# Patient Record
Sex: MALE | Race: WHITE | NOT HISPANIC OR LATINO
[De-identification: names, ages, dates, MRNs, and addresses within clinical notes are randomized per-mention and may not be internally consistent; named-entity substitution may affect disease eponyms.]

---

## 2017-03-05 ENCOUNTER — TRANSCRIPTION ENCOUNTER (OUTPATIENT)
Age: 52
End: 2017-03-05

## 2017-05-18 ENCOUNTER — APPOINTMENT (OUTPATIENT)
Dept: INTERNAL MEDICINE | Facility: CLINIC | Age: 52
End: 2017-05-18

## 2017-05-18 VITALS
HEIGHT: 70 IN | DIASTOLIC BLOOD PRESSURE: 90 MMHG | BODY MASS INDEX: 32.35 KG/M2 | SYSTOLIC BLOOD PRESSURE: 140 MMHG | WEIGHT: 226 LBS | HEART RATE: 82 BPM

## 2017-05-18 VITALS — DIASTOLIC BLOOD PRESSURE: 88 MMHG | SYSTOLIC BLOOD PRESSURE: 140 MMHG

## 2017-07-20 ENCOUNTER — RESULT REVIEW (OUTPATIENT)
Age: 52
End: 2017-07-20

## 2017-12-14 ENCOUNTER — APPOINTMENT (OUTPATIENT)
Dept: INTERNAL MEDICINE | Facility: CLINIC | Age: 52
End: 2017-12-14
Payer: COMMERCIAL

## 2017-12-14 VITALS — DIASTOLIC BLOOD PRESSURE: 86 MMHG | SYSTOLIC BLOOD PRESSURE: 150 MMHG

## 2017-12-14 VITALS
HEART RATE: 74 BPM | BODY MASS INDEX: 33.07 KG/M2 | WEIGHT: 231 LBS | HEIGHT: 70 IN | SYSTOLIC BLOOD PRESSURE: 156 MMHG | DIASTOLIC BLOOD PRESSURE: 90 MMHG

## 2017-12-14 PROCEDURE — 90688 IIV4 VACCINE SPLT 0.5 ML IM: CPT

## 2017-12-14 PROCEDURE — 99396 PREV VISIT EST AGE 40-64: CPT | Mod: 25

## 2017-12-14 PROCEDURE — G0008: CPT

## 2017-12-18 ENCOUNTER — MEDICATION RENEWAL (OUTPATIENT)
Age: 52
End: 2017-12-18

## 2018-06-11 ENCOUNTER — APPOINTMENT (OUTPATIENT)
Dept: INTERNAL MEDICINE | Facility: CLINIC | Age: 53
End: 2018-06-11
Payer: COMMERCIAL

## 2018-06-11 VITALS
DIASTOLIC BLOOD PRESSURE: 70 MMHG | HEIGHT: 70 IN | WEIGHT: 238 LBS | SYSTOLIC BLOOD PRESSURE: 140 MMHG | HEART RATE: 86 BPM | BODY MASS INDEX: 34.07 KG/M2

## 2018-06-11 PROCEDURE — 99213 OFFICE O/P EST LOW 20 MIN: CPT

## 2018-06-29 ENCOUNTER — MEDICATION RENEWAL (OUTPATIENT)
Age: 53
End: 2018-06-29

## 2018-09-17 ENCOUNTER — RX RENEWAL (OUTPATIENT)
Age: 53
End: 2018-09-17

## 2018-10-14 ENCOUNTER — RX RENEWAL (OUTPATIENT)
Age: 53
End: 2018-10-14

## 2018-12-17 ENCOUNTER — APPOINTMENT (OUTPATIENT)
Dept: INTERNAL MEDICINE | Facility: CLINIC | Age: 53
End: 2018-12-17
Payer: COMMERCIAL

## 2018-12-17 ENCOUNTER — NON-APPOINTMENT (OUTPATIENT)
Age: 53
End: 2018-12-17

## 2018-12-17 VITALS
SYSTOLIC BLOOD PRESSURE: 130 MMHG | HEART RATE: 83 BPM | OXYGEN SATURATION: 98 % | HEIGHT: 70 IN | DIASTOLIC BLOOD PRESSURE: 70 MMHG | WEIGHT: 234 LBS | BODY MASS INDEX: 33.5 KG/M2

## 2018-12-17 DIAGNOSIS — Z87.898 PERSONAL HISTORY OF OTHER SPECIFIED CONDITIONS: ICD-10-CM

## 2018-12-17 DIAGNOSIS — Z80.1 FAMILY HISTORY OF MALIGNANT NEOPLASM OF TRACHEA, BRONCHUS AND LUNG: ICD-10-CM

## 2018-12-17 DIAGNOSIS — Z87.09 PERSONAL HISTORY OF OTHER DISEASES OF THE RESPIRATORY SYSTEM: ICD-10-CM

## 2018-12-17 PROCEDURE — G0008: CPT

## 2018-12-17 PROCEDURE — 90688 IIV4 VACCINE SPLT 0.5 ML IM: CPT

## 2018-12-17 PROCEDURE — 99396 PREV VISIT EST AGE 40-64: CPT | Mod: 25

## 2018-12-17 PROCEDURE — 93000 ELECTROCARDIOGRAM COMPLETE: CPT

## 2018-12-17 NOTE — PHYSICAL EXAM
[No Acute Distress] : no acute distress [Normal Sclera/Conjunctiva] : normal sclera/conjunctiva [PERRL] : pupils equal round and reactive to light [EOMI] : extraocular movements intact [Normal Oropharynx] : the oropharynx was normal [Normal TMs] : both tympanic membranes were normal [Supple] : supple [No Respiratory Distress] : no respiratory distress  [Clear to Auscultation] : lungs were clear to auscultation bilaterally [Normal Rate] : normal rate  [Regular Rhythm] : with a regular rhythm [Normal S1, S2] : normal S1 and S2 [Pedal Pulses Present] : the pedal pulses are present [No Edema] : there was no peripheral edema [No Extremity Clubbing/Cyanosis] : no extremity clubbing/cyanosis [Soft] : abdomen soft [Non Tender] : non-tender [Normal Bowel Sounds] : normal bowel sounds [Normal Anterior Cervical Nodes] : no anterior cervical lymphadenopathy [No CVA Tenderness] : no CVA  tenderness [No Spinal Tenderness] : no spinal tenderness [No Joint Swelling] : no joint swelling [Grossly Normal Strength/Tone] : grossly normal strength/tone [No Focal Deficits] : no focal deficits [Alert and Oriented x3] : oriented to person, place, and time [Comprehensive Foot Exam Normal] : Right and left foot were examined and both feet are normal. No ulcers in either foot. Toes are normal and with full ROM.  Normal tactile sensation with monofilament testing throughout both feet [de-identified] : obese [de-identified] : pt  localizes left back pain to mid back..NT to palp. elicited with lateral rotation [de-identified] : FROM hips/knees [de-identified] : sm scaly plaques ext

## 2018-12-17 NOTE — REVIEW OF SYSTEMS
[Diarrhea] : diarrhea [Heartburn] : heartburn [Muscle Pain] : muscle pain [Back Pain] : back pain [Skin Rash] : skin rash [Negative] : Respiratory [Fever] : no fever [Chills] : no chills [Night Sweats] : no night sweats [Chest Pain] : no chest pain [Palpitations] : no palpitations [Claudication] : no  leg claudication [Lower Ext Edema] : no lower extremity edema [Abdominal Pain] : no abdominal pain [Nausea] : no nausea [Constipation] : no constipation [Vomiting] : no vomiting [Melena] : no melena [Dysuria] : no dysuria [Hematuria] : no hematuria [Joint Pain] : no joint pain [Itching] : no itching [Headache] : no headache [Dizziness] : no dizziness [Fainting] : no fainting [Easy Bleeding] : no easy bleeding [Easy Bruising] : no easy bruising [de-identified] : scattered dry plaques (ext)

## 2018-12-17 NOTE — ASSESSMENT
[FreeTextEntry1] : 53 male hx hypertriglyceridemia and HTN here for his CPE\par 1.HTN: good control low dose med\par EKG\par 2.Hyperlipidemia: lipids\par counselled re diet \par 3.PULM: hx asthma \par had ?viral URI with cough\par now resolved\par counselled re prn albuterol. seek  medical attention if resp comp/cough spasms..may need steroid inhaler prn\par flu vaccine today\par 4.Change in BM : 4-5  loose BM daily\par no weight loss\par no other sxs\par he denies diet change or new meds/sup\par ??IBS. last colonoscopy 2016\par 2 weeks trial of probiotics. if  not improved, pt to see GI\par check labs\par 5.upper GI: ?infreq GERD and ?c/o "early satiety" with NO weight  loss, N/N or abd pain\par check labs\par GI for ??upper EGD\par 6.Neuro: hx venous malfor\par MRI jan 2018\par 7.Left achilles: pt to see ortho\par 8.Back pain: ??muscular in origin . ?exacerabated by left achilles pain. Muscle strain\par short trial  of OTC NSAIDs with food prn. \par ortho consult re achiiles and back

## 2018-12-17 NOTE — HISTORY OF PRESENT ILLNESS
[FreeTextEntry1] : here for his CPE\par c/o back pain and left heel pain [de-identified] : pt reports 'couple months " hx of recurring left mid back pain which he correlates to left foot pain . He reports hx left achiiles injury years ago...now recurred. pain with getting up to ambulate. better after awhile of walking but ++discomfort with ambulation which is preventing him from exercising. \par The left sided back pain radiates to buttock . worse when he turns in bed. Has not tried to take anything for it other than use hot pack in bed. overall , he does states pain in back and achilles is better with conservative rx\par Also c/o change in his BM..loose BM 4-5 times per day . baseline was 1-2 . denies assoc abd pain, N. V or BRBPR/melena. He reports chronic  "feeling full" quicker . yet has had no weight loss. infre heartburn sxs. no N/V\par

## 2018-12-18 LAB
ALBUMIN SERPL ELPH-MCNC: 4.4 G/DL
ALP BLD-CCNC: 78 U/L
ALT SERPL-CCNC: 74 U/L
ANION GAP SERPL CALC-SCNC: 12 MMOL/L
AST SERPL-CCNC: 27 U/L
BASOPHILS # BLD AUTO: 0.03 K/UL
BASOPHILS NFR BLD AUTO: 0.4 %
BILIRUB SERPL-MCNC: 0.4 MG/DL
BUN SERPL-MCNC: 14 MG/DL
CALCIUM SERPL-MCNC: 9.4 MG/DL
CHLORIDE SERPL-SCNC: 104 MMOL/L
CHOLEST SERPL-MCNC: 163 MG/DL
CHOLEST/HDLC SERPL: 3.4 RATIO
CO2 SERPL-SCNC: 26 MMOL/L
CREAT SERPL-MCNC: 0.95 MG/DL
EOSINOPHIL # BLD AUTO: 0.21 K/UL
EOSINOPHIL NFR BLD AUTO: 2.9 %
GLUCOSE SERPL-MCNC: 112 MG/DL
HBA1C MFR BLD HPLC: 6 %
HCT VFR BLD CALC: 45.6 %
HCV AB SER QL: NONREACTIVE
HCV S/CO RATIO: 0.12 S/CO
HDLC SERPL-MCNC: 48 MG/DL
HGB BLD-MCNC: 15.3 G/DL
IMM GRANULOCYTES NFR BLD AUTO: 0.1 %
LDLC SERPL CALC-MCNC: 81 MG/DL
LYMPHOCYTES # BLD AUTO: 2.46 K/UL
LYMPHOCYTES NFR BLD AUTO: 34.4 %
MAN DIFF?: NORMAL
MCHC RBC-ENTMCNC: 30.5 PG
MCHC RBC-ENTMCNC: 33.6 GM/DL
MCV RBC AUTO: 91 FL
MONOCYTES # BLD AUTO: 0.44 K/UL
MONOCYTES NFR BLD AUTO: 6.2 %
NEUTROPHILS # BLD AUTO: 4 K/UL
NEUTROPHILS NFR BLD AUTO: 56 %
PLATELET # BLD AUTO: 203 K/UL
POTASSIUM SERPL-SCNC: 4.7 MMOL/L
PROT SERPL-MCNC: 7.1 G/DL
RBC # BLD: 5.01 M/UL
RBC # FLD: 14.3 %
SODIUM SERPL-SCNC: 142 MMOL/L
TRIGL SERPL-MCNC: 172 MG/DL
TSH SERPL-ACNC: 2.34 UIU/ML
WBC # FLD AUTO: 7.15 K/UL

## 2019-02-14 ENCOUNTER — CLINICAL ADVICE (OUTPATIENT)
Age: 54
End: 2019-02-14

## 2019-03-23 ENCOUNTER — EMERGENCY (EMERGENCY)
Facility: HOSPITAL | Age: 54
LOS: 1 days | Discharge: HOME | End: 2019-03-23
Attending: PERSONAL EMERGENCY RESPONSE ATTENDANT
Payer: COMMERCIAL

## 2019-03-23 VITALS
SYSTOLIC BLOOD PRESSURE: 145 MMHG | OXYGEN SATURATION: 99 % | DIASTOLIC BLOOD PRESSURE: 88 MMHG | TEMPERATURE: 98 F | HEART RATE: 72 BPM | RESPIRATION RATE: 20 BRPM

## 2019-03-23 VITALS — HEIGHT: 70.5 IN | WEIGHT: 229.94 LBS

## 2019-03-23 LAB
ALBUMIN SERPL ELPH-MCNC: 4.3 G/DL — SIGNIFICANT CHANGE UP (ref 3.3–5)
ALP SERPL-CCNC: 110 U/L — SIGNIFICANT CHANGE UP (ref 40–120)
ALT FLD-CCNC: 62 U/L — HIGH (ref 10–45)
ANION GAP SERPL CALC-SCNC: 14 MMOL/L — SIGNIFICANT CHANGE UP (ref 5–17)
APPEARANCE UR: CLEAR — SIGNIFICANT CHANGE UP
APTT BLD: 35.6 SEC — SIGNIFICANT CHANGE UP (ref 27.5–36.3)
AST SERPL-CCNC: 23 U/L — SIGNIFICANT CHANGE UP (ref 10–40)
BACTERIA # UR AUTO: ABNORMAL
BASOPHILS # BLD AUTO: 0.1 K/UL — SIGNIFICANT CHANGE UP (ref 0–0.2)
BASOPHILS NFR BLD AUTO: 0.7 % — SIGNIFICANT CHANGE UP (ref 0–2)
BILIRUB SERPL-MCNC: 0.3 MG/DL — SIGNIFICANT CHANGE UP (ref 0.2–1.2)
BILIRUB UR-MCNC: NEGATIVE — SIGNIFICANT CHANGE UP
BUN SERPL-MCNC: 11 MG/DL — SIGNIFICANT CHANGE UP (ref 7–23)
CALCIUM SERPL-MCNC: 9.7 MG/DL — SIGNIFICANT CHANGE UP (ref 8.4–10.5)
CHLORIDE SERPL-SCNC: 105 MMOL/L — SIGNIFICANT CHANGE UP (ref 96–108)
CO2 SERPL-SCNC: 24 MMOL/L — SIGNIFICANT CHANGE UP (ref 22–31)
COLOR SPEC: SIGNIFICANT CHANGE UP
CREAT SERPL-MCNC: 0.99 MG/DL — SIGNIFICANT CHANGE UP (ref 0.5–1.3)
DIFF PNL FLD: ABNORMAL
EOSINOPHIL # BLD AUTO: 0.3 K/UL — SIGNIFICANT CHANGE UP (ref 0–0.5)
EOSINOPHIL NFR BLD AUTO: 3.1 % — SIGNIFICANT CHANGE UP (ref 0–6)
EPI CELLS # UR: 3 /HPF — SIGNIFICANT CHANGE UP
GAS PNL BLDV: SIGNIFICANT CHANGE UP
GLUCOSE SERPL-MCNC: 128 MG/DL — HIGH (ref 70–99)
GLUCOSE UR QL: NEGATIVE — SIGNIFICANT CHANGE UP
HCT VFR BLD CALC: 45.4 % — SIGNIFICANT CHANGE UP (ref 39–50)
HGB BLD-MCNC: 15.6 G/DL — SIGNIFICANT CHANGE UP (ref 13–17)
HYALINE CASTS # UR AUTO: 0 /LPF — SIGNIFICANT CHANGE UP (ref 0–2)
INR BLD: 0.96 RATIO — SIGNIFICANT CHANGE UP (ref 0.88–1.16)
KETONES UR-MCNC: NEGATIVE — SIGNIFICANT CHANGE UP
LEUKOCYTE ESTERASE UR-ACNC: NEGATIVE — SIGNIFICANT CHANGE UP
LIDOCAIN IGE QN: 28 U/L — SIGNIFICANT CHANGE UP (ref 7–60)
LYMPHOCYTES # BLD AUTO: 2.7 K/UL — SIGNIFICANT CHANGE UP (ref 1–3.3)
LYMPHOCYTES # BLD AUTO: 28.9 % — SIGNIFICANT CHANGE UP (ref 13–44)
MCHC RBC-ENTMCNC: 31 PG — SIGNIFICANT CHANGE UP (ref 27–34)
MCHC RBC-ENTMCNC: 34.5 GM/DL — SIGNIFICANT CHANGE UP (ref 32–36)
MCV RBC AUTO: 90 FL — SIGNIFICANT CHANGE UP (ref 80–100)
MONOCYTES # BLD AUTO: 0.7 K/UL — SIGNIFICANT CHANGE UP (ref 0–0.9)
MONOCYTES NFR BLD AUTO: 7.2 % — SIGNIFICANT CHANGE UP (ref 2–14)
NEUTROPHILS # BLD AUTO: 5.5 K/UL — SIGNIFICANT CHANGE UP (ref 1.8–7.4)
NEUTROPHILS NFR BLD AUTO: 60.2 % — SIGNIFICANT CHANGE UP (ref 43–77)
NITRITE UR-MCNC: NEGATIVE — SIGNIFICANT CHANGE UP
PH UR: 6 — SIGNIFICANT CHANGE UP (ref 5–8)
PLATELET # BLD AUTO: 248 K/UL — SIGNIFICANT CHANGE UP (ref 150–400)
POTASSIUM SERPL-MCNC: 4.7 MMOL/L — SIGNIFICANT CHANGE UP (ref 3.5–5.3)
POTASSIUM SERPL-SCNC: 4.7 MMOL/L — SIGNIFICANT CHANGE UP (ref 3.5–5.3)
PROT SERPL-MCNC: 7.3 G/DL — SIGNIFICANT CHANGE UP (ref 6–8.3)
PROT UR-MCNC: SIGNIFICANT CHANGE UP
PROTHROM AB SERPL-ACNC: 10.9 SEC — SIGNIFICANT CHANGE UP (ref 10–12.9)
RBC # BLD: 5.05 M/UL — SIGNIFICANT CHANGE UP (ref 4.2–5.8)
RBC # FLD: 13.1 % — SIGNIFICANT CHANGE UP (ref 10.3–14.5)
RBC CASTS # UR COMP ASSIST: 1 /HPF — SIGNIFICANT CHANGE UP (ref 0–4)
SODIUM SERPL-SCNC: 143 MMOL/L — SIGNIFICANT CHANGE UP (ref 135–145)
SP GR SPEC: >1.05 (ref 1.01–1.02)
UROBILINOGEN FLD QL: NEGATIVE — SIGNIFICANT CHANGE UP
WBC # BLD: 9.2 K/UL — SIGNIFICANT CHANGE UP (ref 3.8–10.5)
WBC # FLD AUTO: 9.2 K/UL — SIGNIFICANT CHANGE UP (ref 3.8–10.5)
WBC UR QL: 7 /HPF — HIGH (ref 0–5)

## 2019-03-23 PROCEDURE — 80053 COMPREHEN METABOLIC PANEL: CPT

## 2019-03-23 PROCEDURE — 85014 HEMATOCRIT: CPT

## 2019-03-23 PROCEDURE — 99284 EMERGENCY DEPT VISIT MOD MDM: CPT | Mod: 25

## 2019-03-23 PROCEDURE — 99284 EMERGENCY DEPT VISIT MOD MDM: CPT

## 2019-03-23 PROCEDURE — 84132 ASSAY OF SERUM POTASSIUM: CPT

## 2019-03-23 PROCEDURE — 84295 ASSAY OF SERUM SODIUM: CPT

## 2019-03-23 PROCEDURE — 85610 PROTHROMBIN TIME: CPT

## 2019-03-23 PROCEDURE — 82565 ASSAY OF CREATININE: CPT

## 2019-03-23 PROCEDURE — 74177 CT ABD & PELVIS W/CONTRAST: CPT

## 2019-03-23 PROCEDURE — 85730 THROMBOPLASTIN TIME PARTIAL: CPT

## 2019-03-23 PROCEDURE — 82330 ASSAY OF CALCIUM: CPT

## 2019-03-23 PROCEDURE — 83605 ASSAY OF LACTIC ACID: CPT

## 2019-03-23 PROCEDURE — 83690 ASSAY OF LIPASE: CPT

## 2019-03-23 PROCEDURE — 82947 ASSAY GLUCOSE BLOOD QUANT: CPT

## 2019-03-23 PROCEDURE — 74177 CT ABD & PELVIS W/CONTRAST: CPT | Mod: 26

## 2019-03-23 PROCEDURE — 82803 BLOOD GASES ANY COMBINATION: CPT

## 2019-03-23 PROCEDURE — 85027 COMPLETE CBC AUTOMATED: CPT

## 2019-03-23 PROCEDURE — 82435 ASSAY OF BLOOD CHLORIDE: CPT

## 2019-03-23 PROCEDURE — 81001 URINALYSIS AUTO W/SCOPE: CPT

## 2019-03-23 NOTE — ED PROVIDER NOTE - NS ED ROS FT
Please see HPI section of chart for further detailed Review of Systems    left lower quadrant abdominal pain  BRBPR  dark bloody stool  nausea, vomiting

## 2019-03-23 NOTE — ED PROVIDER NOTE - CLINICAL SUMMARY MEDICAL DECISION MAKING FREE TEXT BOX
53M, hx HTN, with 1 day left lower quadrant pain associated w nausea and vomiting. Also rectal bleeding/BRBPR/dark stools as per HPI. Labs grossly wnl. CT as above, UVJ stone. Concern for stone as primary source of pain. Will check UA, stool FOB to eval for bleed. Well appearing. Likely anticipate d/c home with f/u and strict return precautions  Tyron Garza MD, PGY2 Emergency Medicine

## 2019-03-23 NOTE — ED PROVIDER NOTE - CARE PLAN
Principal Discharge DX:	Kidney stone Principal Discharge DX:	Kidney stone  Goal:	L UVJ  Secondary Diagnosis:	Rectal bleeding

## 2019-03-23 NOTE — ED PROVIDER NOTE - OBJECTIVE STATEMENT
53M, hx HTN, presenting w chief complaint of 1 day left lower quadrant. Associated with nausea and 1 episode vomiting. Also reports intermittent episodes of BRBPR over the last few weeks, and then 1 episodes today of dark bloody stool. No prior hx of similar. Denies any associated fevers or chills, headache, dizziness, lightheadedness, chest pain, shortness of breath, diarrhea or constipation, weakness, numbness. No prior hx renal stone, GI bleed. Current smoker. Allergy to PCN (anaphylaxis). No drugs, alcohol. Amlodipine, no other meds. Colonoscopy 2 yrs ago, reportedly normal.   Patient assessed initially by StatDoc, labs and CT ordered prior to my evaluation. Labs grossly wnl, UA pending. CT with left sided UVJ stone, no other significant acute pathology.

## 2019-03-23 NOTE — ED PROVIDER NOTE - ATTENDING CONTRIBUTION TO CARE
Attending MD Shah. Agree with above.  Pt is a 52 yo male with pmhx of HTN presenting to Ed today with primary chief complaint of LLQ pain x 1 day.  Pt denies dark maroon stools x several days in contrast to Qdoc note.  Endorses occasional bright red blood for several weeks but today 1 episode of dark maroon blood today.  Pt denies CP/SOB/light-headedness/n/v/d.  CT grossly normal except for 0.1 cm UVJ stone on L.  No evidence of diverticulitis/diverticulosis on CT.  Pt made aware of incidental findings (incidental nodules). Pt takes no AC.  Pt is a smoker.  Last colonoscopy 2 years ago wnl. Attending MD Shah. Agree with above.  Pt is a 54 yo male with pmhx of HTN presenting to Ed today with primary chief complaint of LLQ pain x 1 day.  Pt denies dark maroon stools x several days in contrast to Qdoc note.  Endorses occasional bright red blood for several weeks but today 1 episode of dark maroon blood today.  Pt denies CP/SOB/light-headedness/n/v/d.  CT grossly normal except for 0.1 cm UVJ stone on L.  No evidence of diverticulitis/diverticulosis on CT.  Pt made aware of incidental findings (incidental nodules). Pt takes no AC.  Pt is a smoker.  Last colonoscopy 2 years ago wnl.  Pt has no TTP on abdominal exam.  Endorses sudden onset intermittent sig LLQ pain c/w renal stone found on CT.  Small volume BRBPR reported most notable with wiping.  Denies sig/gross hemorrhage.  Denies melena.  Pt with no currently visualized/previously known diverticuli.  Strong hx of hemorrhoids.  He has no current rectal/perirectal pain but does endorse very difficult BP prior to episode of BRB.  Pt extensively counseled re: need to follow-up with urology and GI.  Current L renal stone, concern for poss internal hemorrhoids.  Pt educated re: need to return to ED for large volume bleeding/hemorrhage/development of CP/SOB/light-headedness/severe or worsened abdominal pain, fevers, dark or reduced urine output, severe flank pain, burning with urination.  Pt verbalizes understanding of above follow-up and return precautions.

## 2019-03-23 NOTE — ED PROVIDER NOTE - PHYSICAL EXAMINATION
General: Well appearing, alert, oriented, no acute distress. Resting in bed.  HEENT: PERRLA EOMI. No trauma/bruising noted to head or face.   CV: Regular rate and rhythm, S1/S2, no murmurs/rubs/gallops noted on exam.  Lungs: Clear to ascultation bilaterally, no wheezes/crackles/rales noted on exam.  Abdomen: Soft, non tender, non distended, no guarding or rebound.   MSK: Full ROM of upper and lower extremities bilaterally. Full ROM of neck. No gross deformities noted to extremities.  Neuro: Awake, A+O x4, moving all extremities spontaneously. CN 2-12 grossly intact. Strength and sensation grossly intact to all extremities. Ambulatory w/o assist, normal gait.   Extremities: No swelling or edema noted to extremities.  Skin: No rash or bruising noted on exam.

## 2019-03-23 NOTE — ED PROVIDER NOTE - NSFOLLOWUPCLINICS_GEN_ALL_ED_FT
[facility]  Urology  [address] [facility] - 3rd [place] [address]  Phone: [phone]  Fax:     [facility]  Gastroenterology  [address] [place] [id number]  Phone: [phone]  Fax:   Follow Up Time:

## 2019-03-23 NOTE — ED PROVIDER NOTE - PRO INTERPRETER NEED 2
Benefits, risks, and possible complications of procedure explained to patient/caregiver who verbalized understanding and gave verbal consent. English

## 2019-03-23 NOTE — ED PROVIDER NOTE - NSFOLLOWUPINSTRUCTIONS_ED_ALL_ED_FT
Please follow up with your primary medical doctor for further care    Please follow up with Urology and Gastroenterology this week for further care    Take motrin 600mg every 6 hours as needed for pain  Take Tylenol up to 650 mg every 6 hours as needed for pain.    Return to hospital for any new or concerning symptoms, including but not limited to: fevers, chills, nausea, vomiting, headache, dizziness, lightheadedness, chest pain, shortness of breath, difficulty breathing, abdominal pain, weakness, worsening rectal bleeding, or any other new or concerning symptoms.

## 2019-03-23 NOTE — ED ADULT NURSE NOTE - OBJECTIVE STATEMENT
patient received alert & oriented x3. Seen by qdoc in triage. Came in complaining of abdominal/back pain with blood in stools x1 nausea & vomiting x1 today. On examination patient claimed still a little nauseous but pain resolved.

## 2019-03-23 NOTE — ED STATDOCS - OBJECTIVE STATEMENT
52 y/o male PMHx HTN, not on AC or ASA use presented to the ED c/o LLQ pain x1 day and maroon colored stools x3-4 days. Patient reported intermittent well localized abdominal pain rated 5/10 and had one episode of NBNB emesis. Patient did not take meds for pain. Patient denied urinary symptoms, CP, SOB, fever, chills, previous abdominal surgeries, back . Last C-scope 2 years ago was normal

## 2019-04-03 ENCOUNTER — APPOINTMENT (OUTPATIENT)
Dept: INTERNAL MEDICINE | Facility: CLINIC | Age: 54
End: 2019-04-03
Payer: COMMERCIAL

## 2019-04-03 VITALS
DIASTOLIC BLOOD PRESSURE: 72 MMHG | HEART RATE: 88 BPM | SYSTOLIC BLOOD PRESSURE: 140 MMHG | WEIGHT: 233 LBS | OXYGEN SATURATION: 98 % | BODY MASS INDEX: 33.36 KG/M2 | HEIGHT: 70 IN

## 2019-04-03 DIAGNOSIS — N20.0 CALCULUS OF KIDNEY: ICD-10-CM

## 2019-04-03 PROCEDURE — 99214 OFFICE O/P EST MOD 30 MIN: CPT

## 2019-04-03 NOTE — REVIEW OF SYSTEMS
[Fever] : no fever [Chills] : no chills [Night Sweats] : no night sweats [Shortness Of Breath] : no shortness of breath [Cough] : no cough [Abdominal Pain] : no abdominal pain [Constipation] : no constipation [Diarrhea] : no diarrhea [Heartburn] : no heartburn [Melena] : no melena [Dysuria] : no dysuria [Incontinence] : no incontinence [Hesitancy] : no hesitancy [Hematuria] : no hematuria [Frequency] : no frequency [Back Pain] : back pain [Negative] : Respiratory [FreeTextEntry9] : persistent left lower back ache. no radiation of pain

## 2019-04-03 NOTE — ASSESSMENT
[FreeTextEntry1] : 1.S/p renal colic\par  has been asxs\par UA\par Renal US to f/u hydro seen on CT\par 2.Hematochezia: last colonoscopy 2016\par pt to see GI re repeat \par 3.Incidental findings of ABD CT:\par adrenal nodule on left : MRI\par Right lower lung opacity;chest CT

## 2019-04-03 NOTE — PHYSICAL EXAM
[No Acute Distress] : no acute distress [No Respiratory Distress] : no respiratory distress  [Clear to Auscultation] : lungs were clear to auscultation bilaterally [Normal Percussion] : the chest was normal to percussion [Normal Rate] : normal rate  [Regular Rhythm] : with a regular rhythm [No Edema] : there was no peripheral edema [Soft] : abdomen soft [Non Tender] : non-tender [Normal Bowel Sounds] : normal bowel sounds [No CVA Tenderness] : no CVA  tenderness [de-identified] : obese

## 2019-04-03 NOTE — HISTORY OF PRESENT ILLNESS
[FreeTextEntry8] : presented to the ER on March 23 with LLQ pain. also had "associated" episode of BRBPR . \par CT ABd in ER cw renal calculus...pain resolved by the time he was dc ( after 8 hrs). he has been asxs since ER visit. \par He reports "normal " BM pattern up til ER visit. but for some reason , he thought he saw <Maroon coating of stool. He has had prior similar episodes before /has hx hemorrhoids . denies pain or constipation..in fact, has 2-3 BM formed per day as baseline

## 2019-04-04 LAB
APPEARANCE: CLEAR
BACTERIA: ABNORMAL
BILIRUBIN URINE: NEGATIVE
BLOOD URINE: ABNORMAL
COLOR: YELLOW
GLUCOSE QUALITATIVE U: NEGATIVE
HYALINE CASTS: 1 /LPF
KETONES URINE: NEGATIVE
LEUKOCYTE ESTERASE URINE: NEGATIVE
MICROSCOPIC-UA: NORMAL
NITRITE URINE: NEGATIVE
PH URINE: 5.5
PROTEIN URINE: NORMAL
RED BLOOD CELLS URINE: 2 /HPF
SPECIFIC GRAVITY URINE: 1.03
SQUAMOUS EPITHELIAL CELLS: 3 /HPF
UROBILINOGEN URINE: NORMAL
WHITE BLOOD CELLS URINE: 2 /HPF

## 2019-04-05 LAB — BACTERIA UR CULT: NORMAL

## 2019-05-13 ENCOUNTER — RX RENEWAL (OUTPATIENT)
Age: 54
End: 2019-05-13

## 2019-06-24 ENCOUNTER — RESULT REVIEW (OUTPATIENT)
Age: 54
End: 2019-06-24

## 2019-07-10 ENCOUNTER — RESULT REVIEW (OUTPATIENT)
Age: 54
End: 2019-07-10

## 2019-08-02 ENCOUNTER — APPOINTMENT (OUTPATIENT)
Dept: THORACIC SURGERY | Facility: CLINIC | Age: 54
End: 2019-08-02
Payer: COMMERCIAL

## 2019-08-02 ENCOUNTER — OUTPATIENT (OUTPATIENT)
Dept: OUTPATIENT SERVICES | Facility: HOSPITAL | Age: 54
LOS: 1 days | End: 2019-08-02
Payer: COMMERCIAL

## 2019-08-02 VITALS
BODY MASS INDEX: 34.07 KG/M2 | TEMPERATURE: 97.6 F | DIASTOLIC BLOOD PRESSURE: 78 MMHG | WEIGHT: 238 LBS | RESPIRATION RATE: 19 BRPM | SYSTOLIC BLOOD PRESSURE: 159 MMHG | OXYGEN SATURATION: 98 % | HEART RATE: 78 BPM | HEIGHT: 70 IN

## 2019-08-02 DIAGNOSIS — Z01.818 ENCOUNTER FOR OTHER PREPROCEDURAL EXAMINATION: ICD-10-CM

## 2019-08-02 LAB
ALBUMIN SERPL ELPH-MCNC: 4.6 G/DL — SIGNIFICANT CHANGE UP (ref 3.3–5)
ALP SERPL-CCNC: 96 U/L — SIGNIFICANT CHANGE UP (ref 40–120)
ALT FLD-CCNC: 61 U/L — HIGH (ref 10–45)
ANION GAP SERPL CALC-SCNC: 10 MMOL/L — SIGNIFICANT CHANGE UP (ref 5–17)
APPEARANCE UR: CLEAR — SIGNIFICANT CHANGE UP
APTT BLD: 36.8 SEC — HIGH (ref 27.5–36.3)
AST SERPL-CCNC: 27 U/L — SIGNIFICANT CHANGE UP (ref 10–40)
BACTERIA # UR AUTO: PRESENT /HPF
BASOPHILS # BLD AUTO: 0.06 K/UL — SIGNIFICANT CHANGE UP (ref 0–0.2)
BASOPHILS NFR BLD AUTO: 0.8 % — SIGNIFICANT CHANGE UP (ref 0–2)
BILIRUB SERPL-MCNC: 0.4 MG/DL — SIGNIFICANT CHANGE UP (ref 0.2–1.2)
BILIRUB UR-MCNC: NEGATIVE — SIGNIFICANT CHANGE UP
BLD GP AB SCN SERPL QL: NEGATIVE — SIGNIFICANT CHANGE UP
BLD GP AB SCN SERPL QL: NEGATIVE — SIGNIFICANT CHANGE UP
BUN SERPL-MCNC: 14 MG/DL — SIGNIFICANT CHANGE UP (ref 7–23)
CALCIUM SERPL-MCNC: 9.1 MG/DL — SIGNIFICANT CHANGE UP (ref 8.4–10.5)
CHLORIDE SERPL-SCNC: 106 MMOL/L — SIGNIFICANT CHANGE UP (ref 96–108)
CHOLEST SERPL-MCNC: 157 MG/DL — SIGNIFICANT CHANGE UP (ref 10–199)
CO2 SERPL-SCNC: 28 MMOL/L — SIGNIFICANT CHANGE UP (ref 22–31)
COLOR SPEC: YELLOW — SIGNIFICANT CHANGE UP
CREAT SERPL-MCNC: 0.94 MG/DL — SIGNIFICANT CHANGE UP (ref 0.5–1.3)
DIFF PNL FLD: ABNORMAL
EOSINOPHIL # BLD AUTO: 0.26 K/UL — SIGNIFICANT CHANGE UP (ref 0–0.5)
EOSINOPHIL NFR BLD AUTO: 3.3 % — SIGNIFICANT CHANGE UP (ref 0–6)
EPI CELLS # UR: SIGNIFICANT CHANGE UP /HPF (ref 0–5)
GLUCOSE SERPL-MCNC: 104 MG/DL — HIGH (ref 70–99)
GLUCOSE UR QL: NEGATIVE — SIGNIFICANT CHANGE UP
HCT VFR BLD CALC: 45.4 % — SIGNIFICANT CHANGE UP (ref 39–50)
HDLC SERPL-MCNC: 45 MG/DL — SIGNIFICANT CHANGE UP
HGB BLD-MCNC: 14.9 G/DL — SIGNIFICANT CHANGE UP (ref 13–17)
IMM GRANULOCYTES NFR BLD AUTO: 0.3 % — SIGNIFICANT CHANGE UP (ref 0–1.5)
INR BLD: 1.02 — SIGNIFICANT CHANGE UP (ref 0.88–1.16)
KETONES UR-MCNC: NEGATIVE — SIGNIFICANT CHANGE UP
LEUKOCYTE ESTERASE UR-ACNC: NEGATIVE — SIGNIFICANT CHANGE UP
LIPID PNL WITH DIRECT LDL SERPL: 70 MG/DL — SIGNIFICANT CHANGE UP
LYMPHOCYTES # BLD AUTO: 2.67 K/UL — SIGNIFICANT CHANGE UP (ref 1–3.3)
LYMPHOCYTES # BLD AUTO: 33.9 % — SIGNIFICANT CHANGE UP (ref 13–44)
MCHC RBC-ENTMCNC: 30.1 PG — SIGNIFICANT CHANGE UP (ref 27–34)
MCHC RBC-ENTMCNC: 32.8 GM/DL — SIGNIFICANT CHANGE UP (ref 32–36)
MCV RBC AUTO: 91.7 FL — SIGNIFICANT CHANGE UP (ref 80–100)
MONOCYTES # BLD AUTO: 0.77 K/UL — SIGNIFICANT CHANGE UP (ref 0–0.9)
MONOCYTES NFR BLD AUTO: 9.8 % — SIGNIFICANT CHANGE UP (ref 2–14)
NEUTROPHILS # BLD AUTO: 4.09 K/UL — SIGNIFICANT CHANGE UP (ref 1.8–7.4)
NEUTROPHILS NFR BLD AUTO: 51.9 % — SIGNIFICANT CHANGE UP (ref 43–77)
NITRITE UR-MCNC: NEGATIVE — SIGNIFICANT CHANGE UP
NRBC # BLD: 0 /100 WBCS — SIGNIFICANT CHANGE UP (ref 0–0)
PH UR: 5.5 — SIGNIFICANT CHANGE UP (ref 5–8)
PLATELET # BLD AUTO: 243 K/UL — SIGNIFICANT CHANGE UP (ref 150–400)
POTASSIUM SERPL-MCNC: 4.7 MMOL/L — SIGNIFICANT CHANGE UP (ref 3.5–5.3)
POTASSIUM SERPL-SCNC: 4.7 MMOL/L — SIGNIFICANT CHANGE UP (ref 3.5–5.3)
PROT SERPL-MCNC: 7 G/DL — SIGNIFICANT CHANGE UP (ref 6–8.3)
PROT UR-MCNC: NEGATIVE MG/DL — SIGNIFICANT CHANGE UP
PROTHROM AB SERPL-ACNC: 11.5 SEC — SIGNIFICANT CHANGE UP (ref 10–12.9)
RBC # BLD: 4.95 M/UL — SIGNIFICANT CHANGE UP (ref 4.2–5.8)
RBC # FLD: 13.6 % — SIGNIFICANT CHANGE UP (ref 10.3–14.5)
RBC CASTS # UR COMP ASSIST: ABNORMAL /HPF
RH IG SCN BLD-IMP: NEGATIVE — SIGNIFICANT CHANGE UP
RH IG SCN BLD-IMP: NEGATIVE — SIGNIFICANT CHANGE UP
SODIUM SERPL-SCNC: 144 MMOL/L — SIGNIFICANT CHANGE UP (ref 135–145)
SP GR SPEC: >=1.03 — SIGNIFICANT CHANGE UP (ref 1–1.03)
TOTAL CHOLESTEROL/HDL RATIO MEASUREMENT: 3.5 RATIO — SIGNIFICANT CHANGE UP (ref 3.4–9.6)
TRIGL SERPL-MCNC: 209 MG/DL — HIGH (ref 10–149)
UROBILINOGEN FLD QL: 0.2 E.U./DL — SIGNIFICANT CHANGE UP
WBC # BLD: 7.87 K/UL — SIGNIFICANT CHANGE UP (ref 3.8–10.5)
WBC # FLD AUTO: 7.87 K/UL — SIGNIFICANT CHANGE UP (ref 3.8–10.5)
WBC UR QL: < 5 /HPF — SIGNIFICANT CHANGE UP

## 2019-08-02 PROCEDURE — 85025 COMPLETE CBC W/AUTO DIFF WBC: CPT

## 2019-08-02 PROCEDURE — 86900 BLOOD TYPING SEROLOGIC ABO: CPT

## 2019-08-02 PROCEDURE — 80061 LIPID PANEL: CPT

## 2019-08-02 PROCEDURE — 99205 OFFICE O/P NEW HI 60 MIN: CPT

## 2019-08-02 PROCEDURE — 93010 ELECTROCARDIOGRAM REPORT: CPT

## 2019-08-02 PROCEDURE — 80053 COMPREHEN METABOLIC PANEL: CPT

## 2019-08-02 PROCEDURE — 86901 BLOOD TYPING SEROLOGIC RH(D): CPT

## 2019-08-02 PROCEDURE — 86850 RBC ANTIBODY SCREEN: CPT

## 2019-08-02 PROCEDURE — 85730 THROMBOPLASTIN TIME PARTIAL: CPT

## 2019-08-02 PROCEDURE — 93005 ELECTROCARDIOGRAM TRACING: CPT

## 2019-08-02 PROCEDURE — 85610 PROTHROMBIN TIME: CPT

## 2019-08-02 PROCEDURE — 81001 URINALYSIS AUTO W/SCOPE: CPT

## 2019-08-02 NOTE — PHYSICAL EXAM
[General Appearance - Alert] : alert [General Appearance - In No Acute Distress] : in no acute distress [Sclera] : the sclera and conjunctiva were normal [General Appearance - Well Nourished] : well nourished [Extraocular Movements] : extraocular movements were intact [Hearing Threshold Finger Rub Not [place]] : hearing was normal [Outer Ear] : the ears and nose were normal in appearance [Neck Appearance] : the appearance of the neck was normal [Apical Impulse] : the apical impulse was normal [Exaggerated Use Of Accessory Muscles For Inspiration] : no accessory muscle use [Heart Sounds] : normal S1 and S2 [Examination Of The Chest] : the chest was normal in appearance [2+] : right 2+ [Abdomen Soft] : soft [Abdomen Tenderness] : non-tender [Cervical Lymph Nodes Enlarged Posterior Bilaterally] : posterior cervical [No CVA Tenderness] : no ~M costovertebral angle tenderness [Abnormal Walk] : normal gait [Musculoskeletal - Swelling] : no joint swelling seen [Skin Color & Pigmentation] : normal skin color and pigmentation [Skin Turgor] : normal skin turgor [No Focal Deficits] : no focal deficits [] : no rash [Oriented To Time, Place, And Person] : oriented to person, place, and time

## 2019-08-07 ENCOUNTER — APPOINTMENT (OUTPATIENT)
Dept: INTERNAL MEDICINE | Facility: CLINIC | Age: 54
End: 2019-08-07
Payer: COMMERCIAL

## 2019-08-07 VITALS
OXYGEN SATURATION: 98 % | HEART RATE: 68 BPM | SYSTOLIC BLOOD PRESSURE: 144 MMHG | HEIGHT: 70 IN | BODY MASS INDEX: 34.22 KG/M2 | DIASTOLIC BLOOD PRESSURE: 80 MMHG | WEIGHT: 239 LBS

## 2019-08-07 DIAGNOSIS — M54.6 PAIN IN THORACIC SPINE: ICD-10-CM

## 2019-08-07 DIAGNOSIS — S39.012A STRAIN OF MUSCLE, FASCIA AND TENDON OF LOWER BACK, INITIAL ENCOUNTER: ICD-10-CM

## 2019-08-07 DIAGNOSIS — R68.81 EARLY SATIETY: ICD-10-CM

## 2019-08-07 PROCEDURE — 99214 OFFICE O/P EST MOD 30 MIN: CPT

## 2019-08-07 RX ORDER — MELOXICAM 15 MG/1
15 TABLET ORAL DAILY
Qty: 14 | Refills: 0 | Status: DISCONTINUED | COMMUNITY
Start: 2019-02-14 | End: 2019-08-07

## 2019-08-07 RX ORDER — CYCLOBENZAPRINE HYDROCHLORIDE 10 MG/1
10 TABLET, FILM COATED ORAL 3 TIMES DAILY
Qty: 21 | Refills: 0 | Status: DISCONTINUED | COMMUNITY
Start: 2019-02-14 | End: 2019-08-07

## 2019-08-07 NOTE — CONSULT LETTER
[Consult Letter:] : I had the pleasure of evaluating your patient, [unfilled]. [Dear  ___] : Dear  [unfilled], [Consult Closing:] : Thank you very much for allowing me to participate in the care of this patient.  If you have any questions, please do not hesitate to contact me. [Please see my note below.] : Please see my note below. [Sincerely,] : Sincerely, [DrRasheed  ___] : Dr. DIEZ [DrRasheed ___] : Dr. DIEZ [FreeTextEntry2] : Dr Higgins Sha [FreeTextEntry3] : Timi Tsang MD\par Professor, Cardiovascular & Thoracic Surgery\par [facility]\par Director of the [facility] \par Director of Thoracic Surgery, [facility]\par [facility]\par [address]\par [facility] 4th Floor\par [place] [id number]\par Phone: [phone]\par Fax: [phone]

## 2019-08-07 NOTE — HISTORY OF PRESENT ILLNESS
[FreeTextEntry1] : 53 year old male, with pmhx of HTN, asthma, referred by Dr Kate for lung nodules found incidentally while he was worked up for kidney stones in 3/2019. \par \par CT Liver 2017: no lung nodules. \par \par CT Chest 6/20/19\par - 1.9cm RLL nodule\par - 8mm EARL nodule\par \par PFT done on 7/20/19\par - FEV1 = 3.46, 105% of predicted value\par - FVC = 4.09, 100% of predicted value\par - PEF = 11.12, 135% of predicted value\par - DLCO = 30.76, 109% of predicted value\par  \par PET 7/31/19\par - 19mm SUV (SUV 2.7) in RLL unchanged from 6/20/19. It is unclear if this is more likely inflammatory or malignant\par - 9mm nodule in EARL, without suspicious activity on PET\par - 6mm nodule in RLL\par \par Patient doing well. Denies SOB, cough, hemoptysis, chest discomfort, fever/chills.

## 2019-08-07 NOTE — ASSESSMENT
[FreeTextEntry1] : 53 year old male, former smoker, 10 pack years, with pmhx of HTN, asthma, referred by Dr Kate for lung nodules found incidentally while he was worked up for kidney stones in 3/2019. \par \par CT Liver 2017: no lung nodules. \par \par PFT done on 7/20/19\par - FEV1 = 3.46, 105% of predicted value\par - FVC = 4.09, 100% of predicted value\par - PEF = 11.12, 135% of predicted value\par - DLCO = 30.76, 109% of predicted value\par \par CT Chest 6/20/19\par - 1.9cm RLL nodule\par - 8mm EARL nodule\par \par PET 7/31/19\par - 19mm SUV (SUV 2.7) in RLL unchanged from 6/20/19. It is unclear if this is more likely inflammatory or malignant\par - 9mm nodule in EARL, without suspicious activity on PET\par - 6mm nodule in RLL\par \par Patient doing well. Denies SOB, cough, hemoptysis, chest discomfort, fever/chills. \par \par Discussed case with pulmonologist Dr Ronaldo Ferro. Offered patient needle bx vs surgical resection for suspicious nodule in RLL. The patient was counseled on the risks, benefits and alternatives of proceeding with lung resection. Specifically, the risk of bleeding, need for a blood transfusion, DVT, pulmonary embolism, pneumonia, postoperative respiratory insufficiency, postoperative ventilator support, as well as prolonged air leak, need for chest drainage, dysrhythmia, myocardial infarction, postoperative pain syndrome, need for adjuvant therapy, risk of recurrence, need for surveillance, conversion to an open procedure, as well as mortality was discussed with the patient who understands and agrees to the above.\par \par I have reviewed the patient's medical records and diagnostic images at the time of this office consultation and have made the following recommendation.\par Plan:\par 1. Medical clearance with Dr Ifrah Tsang\par 2. RVATS, robotic assisted, wedge of RLL, possible lobectomy, MLND on 8/9

## 2019-08-08 VITALS
RESPIRATION RATE: 16 BRPM | WEIGHT: 233.69 LBS | TEMPERATURE: 97 F | SYSTOLIC BLOOD PRESSURE: 156 MMHG | HEIGHT: 70.5 IN | DIASTOLIC BLOOD PRESSURE: 82 MMHG | HEART RATE: 77 BPM | OXYGEN SATURATION: 97 %

## 2019-08-09 ENCOUNTER — APPOINTMENT (OUTPATIENT)
Dept: THORACIC SURGERY | Facility: HOSPITAL | Age: 54
End: 2019-08-09

## 2019-08-09 ENCOUNTER — INPATIENT (INPATIENT)
Facility: HOSPITAL | Age: 54
LOS: 2 days | Discharge: HOME | DRG: 165 | End: 2019-08-12
Attending: THORACIC SURGERY (CARDIOTHORACIC VASCULAR SURGERY) | Admitting: THORACIC SURGERY (CARDIOTHORACIC VASCULAR SURGERY)
Payer: COMMERCIAL

## 2019-08-09 ENCOUNTER — RESULT REVIEW (OUTPATIENT)
Age: 54
End: 2019-08-09

## 2019-08-09 DIAGNOSIS — Z41.9 ENCOUNTER FOR PROCEDURE FOR PURPOSES OTHER THAN REMEDYING HEALTH STATE, UNSPECIFIED: Chronic | ICD-10-CM

## 2019-08-09 LAB
ANION GAP SERPL CALC-SCNC: 10 MMOL/L — SIGNIFICANT CHANGE UP (ref 5–17)
APTT BLD: 32 SEC — SIGNIFICANT CHANGE UP (ref 27.5–36.3)
BLD GP AB SCN SERPL QL: NEGATIVE — SIGNIFICANT CHANGE UP
BUN SERPL-MCNC: 14 MG/DL — SIGNIFICANT CHANGE UP (ref 7–23)
CALCIUM SERPL-MCNC: 9.1 MG/DL — SIGNIFICANT CHANGE UP (ref 8.4–10.5)
CHLORIDE SERPL-SCNC: 103 MMOL/L — SIGNIFICANT CHANGE UP (ref 96–108)
CO2 SERPL-SCNC: 28 MMOL/L — SIGNIFICANT CHANGE UP (ref 22–31)
CREAT SERPL-MCNC: 0.96 MG/DL — SIGNIFICANT CHANGE UP (ref 0.5–1.3)
GLUCOSE SERPL-MCNC: 145 MG/DL — HIGH (ref 70–99)
GRAM STN FLD: SIGNIFICANT CHANGE UP
GRAM STN FLD: SIGNIFICANT CHANGE UP
HCT VFR BLD CALC: 45.8 % — SIGNIFICANT CHANGE UP (ref 39–50)
HGB BLD-MCNC: 15.2 G/DL — SIGNIFICANT CHANGE UP (ref 13–17)
INR BLD: 1.08 — SIGNIFICANT CHANGE UP (ref 0.88–1.16)
MAGNESIUM SERPL-MCNC: 1.9 MG/DL — SIGNIFICANT CHANGE UP (ref 1.6–2.6)
MCHC RBC-ENTMCNC: 30.3 PG — SIGNIFICANT CHANGE UP (ref 27–34)
MCHC RBC-ENTMCNC: 33.2 GM/DL — SIGNIFICANT CHANGE UP (ref 32–36)
MCV RBC AUTO: 91.4 FL — SIGNIFICANT CHANGE UP (ref 80–100)
NRBC # BLD: 0 /100 WBCS — SIGNIFICANT CHANGE UP (ref 0–0)
PLATELET # BLD AUTO: 231 K/UL — SIGNIFICANT CHANGE UP (ref 150–400)
POTASSIUM SERPL-MCNC: 5 MMOL/L — SIGNIFICANT CHANGE UP (ref 3.5–5.3)
POTASSIUM SERPL-SCNC: 5 MMOL/L — SIGNIFICANT CHANGE UP (ref 3.5–5.3)
PROTHROM AB SERPL-ACNC: 12.2 SEC — SIGNIFICANT CHANGE UP (ref 10–12.9)
RBC # BLD: 5.01 M/UL — SIGNIFICANT CHANGE UP (ref 4.2–5.8)
RBC # FLD: 13.3 % — SIGNIFICANT CHANGE UP (ref 10.3–14.5)
RH IG SCN BLD-IMP: NEGATIVE — SIGNIFICANT CHANGE UP
SODIUM SERPL-SCNC: 141 MMOL/L — SIGNIFICANT CHANGE UP (ref 135–145)
SPECIMEN SOURCE: SIGNIFICANT CHANGE UP
SPECIMEN SOURCE: SIGNIFICANT CHANGE UP
WBC # BLD: 12.25 K/UL — HIGH (ref 3.8–10.5)
WBC # FLD AUTO: 12.25 K/UL — HIGH (ref 3.8–10.5)

## 2019-08-09 PROCEDURE — 99232 SBSQ HOSP IP/OBS MODERATE 35: CPT

## 2019-08-09 PROCEDURE — 71045 X-RAY EXAM CHEST 1 VIEW: CPT | Mod: 26

## 2019-08-09 PROCEDURE — S2900 ROBOTIC SURGICAL SYSTEM: CPT | Mod: NC

## 2019-08-09 PROCEDURE — 31624 DX BRONCHOSCOPE/LAVAGE: CPT

## 2019-08-09 PROCEDURE — 32666 THORACOSCOPY W/WEDGE RESECT: CPT

## 2019-08-09 RX ORDER — MAGNESIUM OXIDE 400 MG ORAL TABLET 241.3 MG
800 TABLET ORAL ONCE
Refills: 0 | Status: COMPLETED | OUTPATIENT
Start: 2019-08-09 | End: 2019-08-09

## 2019-08-09 RX ORDER — ONDANSETRON 8 MG/1
4 TABLET, FILM COATED ORAL ONCE
Refills: 0 | Status: COMPLETED | OUTPATIENT
Start: 2019-08-09 | End: 2019-08-09

## 2019-08-09 RX ORDER — HEPARIN SODIUM 5000 [USP'U]/ML
5000 INJECTION INTRAVENOUS; SUBCUTANEOUS EVERY 8 HOURS
Refills: 0 | Status: DISCONTINUED | OUTPATIENT
Start: 2019-08-09 | End: 2019-08-12

## 2019-08-09 RX ORDER — LIDOCAINE 4 G/100G
1 CREAM TOPICAL DAILY
Refills: 0 | Status: DISCONTINUED | OUTPATIENT
Start: 2019-08-09 | End: 2019-08-12

## 2019-08-09 RX ORDER — ACETAMINOPHEN 500 MG
650 TABLET ORAL EVERY 6 HOURS
Refills: 0 | Status: DISCONTINUED | OUTPATIENT
Start: 2019-08-09 | End: 2019-08-12

## 2019-08-09 RX ORDER — VANCOMYCIN HCL 1 G
2000 VIAL (EA) INTRAVENOUS EVERY 12 HOURS
Refills: 0 | Status: COMPLETED | OUTPATIENT
Start: 2019-08-09 | End: 2019-08-10

## 2019-08-09 RX ORDER — FLUTICASONE PROPIONATE 220 MCG
2 AEROSOL WITH ADAPTER (GRAM) INHALATION
Qty: 0 | Refills: 0 | DISCHARGE

## 2019-08-09 RX ORDER — ALBUTEROL 90 UG/1
2 AEROSOL, METERED ORAL EVERY 6 HOURS
Refills: 0 | Status: DISCONTINUED | OUTPATIENT
Start: 2019-08-09 | End: 2019-08-12

## 2019-08-09 RX ORDER — SENNA PLUS 8.6 MG/1
2 TABLET ORAL AT BEDTIME
Refills: 0 | Status: DISCONTINUED | OUTPATIENT
Start: 2019-08-09 | End: 2019-08-12

## 2019-08-09 RX ORDER — AMLODIPINE BESYLATE 2.5 MG/1
2.5 TABLET ORAL DAILY
Refills: 0 | Status: DISCONTINUED | OUTPATIENT
Start: 2019-08-10 | End: 2019-08-12

## 2019-08-09 RX ORDER — SODIUM CHLORIDE 9 MG/ML
1000 INJECTION, SOLUTION INTRAVENOUS
Refills: 0 | Status: DISCONTINUED | OUTPATIENT
Start: 2019-08-09 | End: 2019-08-10

## 2019-08-09 RX ORDER — BUPIVACAINE 13.3 MG/ML
20 INJECTION, SUSPENSION, LIPOSOMAL INFILTRATION ONCE
Refills: 0 | Status: DISCONTINUED | OUTPATIENT
Start: 2019-08-09 | End: 2019-08-09

## 2019-08-09 RX ORDER — HYDRALAZINE HCL 50 MG
10 TABLET ORAL ONCE
Refills: 0 | Status: COMPLETED | OUTPATIENT
Start: 2019-08-09 | End: 2019-08-09

## 2019-08-09 RX ORDER — PANTOPRAZOLE SODIUM 20 MG/1
40 TABLET, DELAYED RELEASE ORAL
Refills: 0 | Status: DISCONTINUED | OUTPATIENT
Start: 2019-08-09 | End: 2019-08-12

## 2019-08-09 RX ORDER — MORPHINE SULFATE 50 MG/1
2 CAPSULE, EXTENDED RELEASE ORAL ONCE
Refills: 0 | Status: DISCONTINUED | OUTPATIENT
Start: 2019-08-09 | End: 2019-08-09

## 2019-08-09 RX ORDER — DOCUSATE SODIUM 100 MG
100 CAPSULE ORAL
Refills: 0 | Status: DISCONTINUED | OUTPATIENT
Start: 2019-08-09 | End: 2019-08-12

## 2019-08-09 RX ORDER — VANCOMYCIN HCL 1 G
2000 VIAL (EA) INTRAVENOUS ONCE
Refills: 0 | Status: DISCONTINUED | OUTPATIENT
Start: 2019-08-09 | End: 2019-08-09

## 2019-08-09 RX ADMIN — Medication 10 MILLIGRAM(S): at 22:41

## 2019-08-09 RX ADMIN — Medication 250 MILLIGRAM(S): at 22:10

## 2019-08-09 RX ADMIN — ONDANSETRON 4 MILLIGRAM(S): 8 TABLET, FILM COATED ORAL at 20:21

## 2019-08-09 RX ADMIN — MORPHINE SULFATE 2 MILLIGRAM(S): 50 CAPSULE, EXTENDED RELEASE ORAL at 18:45

## 2019-08-09 RX ADMIN — MORPHINE SULFATE 2 MILLIGRAM(S): 50 CAPSULE, EXTENDED RELEASE ORAL at 18:18

## 2019-08-09 RX ADMIN — MAGNESIUM OXIDE 400 MG ORAL TABLET 800 MILLIGRAM(S): 241.3 TABLET ORAL at 19:39

## 2019-08-09 RX ADMIN — HEPARIN SODIUM 5000 UNIT(S): 5000 INJECTION INTRAVENOUS; SUBCUTANEOUS at 22:10

## 2019-08-09 NOTE — BRIEF OPERATIVE NOTE - NSICDXBRIEFPROCEDURE_GEN_ALL_CORE_FT
PROCEDURES:  Wedge resection, lung 09-Aug-2019 16:14:03 R VATS, RA RLL wedge resection Shandra Radford N
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
## 2019-08-09 NOTE — H&P ADULT - HISTORY OF PRESENT ILLNESS
52 y/o male with hx HTN, asthma, referred by Dr. Kate for lung nodules.  Nodules found incidentally while being w/u for kidney stones in 3/2019.  CT liver 2017: no lung nodules.  CT chest 6/20/191.9cm RLL nodule.  8mm EARL nodule.  PFT 7/20/19: FEV1 3.46, 105% predicted, FVC 4.09, 100% predicted, PEF 11.12, 135% predicted, DLCO 30.76, 109% predicted.  PET/CT 7/31/19: 19mm SUV in RLL unchanged from 6/20/19, 9mm nodule in EARL without suspicious activity, 6mm nodule in RLL.      Pt seen/examined in same day surgery area.  No changes to current medical condition.  No recent fevers, chills, illnesses, rashes, cough, sputum, nausea, diarrhea, emesis.  Medication list reviewed, updated.  Pt took Amlodipine this AM with small sip of water, otherwise NPO since 0000 on 8/9/19.  Procedure, risks, expectations, recovery discussed with patient and all questions answered.  Consent obtained.
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
## 2019-08-09 NOTE — BRIEF OPERATIVE NOTE - OPERATION/FINDINGS
right lower lobe lung nodule right lower lobe lung nodule preliminary pathology granulomatous disease r/o TB

## 2019-08-09 NOTE — H&P ADULT - NSICDXPASTMEDICALHX_GEN_ALL_CORE_FT
PAST MEDICAL HISTORY:  Allergic Asthma induced by Cat dandur    Arthritis     Cellulitis     Esophageal reflux     fracture Right tibia     HTN (hypertension)     Labyrinthitis     Lung nodule rll    Nephrolithiasis left    Psoriasis     Sinusitis

## 2019-08-09 NOTE — H&P ADULT - ASSESSMENT
54 y/o male with hx HTN, asthma, referred by Dr. Kate for lung nodules.  Nodules found incidentally while being w/u for kidney stones in 3/2019.  CT liver 2017: no lung nodules.  CT chest 6/20/191.9cm RLL nodule.  8mm EARL nodule. PET/CT 7/31/19: 19mm SUV in RLL unchanged from 6/20/19, 9mm nodule in EARL without suspicious activity, 6mm nodule in RLL.  Pt presents today for R VATs, Robotic Assisted, Right Lower Lobe Wedge Resection, Possible Right Lower Lobectomy, MLND    Assesment:  Problem 1: Lung nodules  - proceed with OR today  - T&C sent, 2 units PRBC on hold  - Consent obtained     Problem 2: HTN  - Restart home Amlodipine once able to take PO and hemodynamics tolerate     Problem 3: Asthma  - Inhalers PRN        I have reviewed clinical labs tests and reports, radiology tests and reports, as well as old patient medical records, and discussed with the refering physician.

## 2019-08-09 NOTE — H&P ADULT - NSHPREVIEWOFSYSTEMS_GEN_ALL_CORE
Review of Systems  CONSTITUTIONAL:  Denies Fevers / chills, sweats, fatigue, weight loss, weight gain                                      NEURO:  Denies paresthesias, seizures, syncope, confusion                                                                                EYES:  Denies Blurry vision, discharge, pain, loss of vision                                                                                    ENMT:  Denies Difficulty hearing, vertigo, dysphagia, epistaxis, recent dental work                                       CV:  Denies Chest pain, palpitations, GARZA, orthopnea                                                                                          RESPIRATORY:  Denies Wheezing, SOB, cough / sputum, hemoptysis                                                                GI:  Denies Nausea, vomiting, diarrhea, constipation, melena, difficulty swallowing                                               : Denies Hematuria, dysuria, urgency, incontinence                                                                                         MUSCULOSKELETAL:  Denies arthritis, joint swelling, muscle weakness                                                             SKIN/BREAST:  Denies rash, itching, hair loss, masses                                                                                            PSYCH:  Denies depression, anxiety, suicidal ideation                                                                                               HEME/LYMPH:  Denies bruises easily, enlarged lymph nodes, tender lymph nodes                                        ENDOCRINE:  Denies cold intolerance, heat intolerance, polydipsia

## 2019-08-10 LAB
ALBUMIN SERPL ELPH-MCNC: 4.2 G/DL — SIGNIFICANT CHANGE UP (ref 3.3–5)
ALP SERPL-CCNC: 66 U/L — SIGNIFICANT CHANGE UP (ref 40–120)
ALT FLD-CCNC: 65 U/L — HIGH (ref 10–45)
ANION GAP SERPL CALC-SCNC: 16 MMOL/L — SIGNIFICANT CHANGE UP (ref 5–17)
APTT BLD: 32.3 SEC — SIGNIFICANT CHANGE UP (ref 27.5–36.3)
AST SERPL-CCNC: 31 U/L — SIGNIFICANT CHANGE UP (ref 10–40)
BILIRUB SERPL-MCNC: 0.7 MG/DL — SIGNIFICANT CHANGE UP (ref 0.2–1.2)
BUN SERPL-MCNC: 13 MG/DL — SIGNIFICANT CHANGE UP (ref 7–23)
CALCIUM SERPL-MCNC: 9.1 MG/DL — SIGNIFICANT CHANGE UP (ref 8.4–10.5)
CHLORIDE SERPL-SCNC: 100 MMOL/L — SIGNIFICANT CHANGE UP (ref 96–108)
CO2 SERPL-SCNC: 21 MMOL/L — LOW (ref 22–31)
CREAT SERPL-MCNC: 0.81 MG/DL — SIGNIFICANT CHANGE UP (ref 0.5–1.3)
GLUCOSE SERPL-MCNC: 188 MG/DL — HIGH (ref 70–99)
HCT VFR BLD CALC: 43.1 % — SIGNIFICANT CHANGE UP (ref 39–50)
HGB BLD-MCNC: 14.8 G/DL — SIGNIFICANT CHANGE UP (ref 13–17)
INR BLD: 1.1 — SIGNIFICANT CHANGE UP (ref 0.88–1.16)
MAGNESIUM SERPL-MCNC: 1.7 MG/DL — SIGNIFICANT CHANGE UP (ref 1.6–2.6)
MCHC RBC-ENTMCNC: 30.7 PG — SIGNIFICANT CHANGE UP (ref 27–34)
MCHC RBC-ENTMCNC: 34.3 GM/DL — SIGNIFICANT CHANGE UP (ref 32–36)
MCV RBC AUTO: 89.4 FL — SIGNIFICANT CHANGE UP (ref 80–100)
NIGHT BLUE STAIN TISS: SIGNIFICANT CHANGE UP
NIGHT BLUE STAIN TISS: SIGNIFICANT CHANGE UP
NRBC # BLD: 0 /100 WBCS — SIGNIFICANT CHANGE UP (ref 0–0)
PHOSPHATE SERPL-MCNC: 3.4 MG/DL — SIGNIFICANT CHANGE UP (ref 2.5–4.5)
PLATELET # BLD AUTO: 249 K/UL — SIGNIFICANT CHANGE UP (ref 150–400)
POTASSIUM SERPL-MCNC: 4 MMOL/L — SIGNIFICANT CHANGE UP (ref 3.5–5.3)
POTASSIUM SERPL-SCNC: 4 MMOL/L — SIGNIFICANT CHANGE UP (ref 3.5–5.3)
PROT SERPL-MCNC: 7 G/DL — SIGNIFICANT CHANGE UP (ref 6–8.3)
PROTHROM AB SERPL-ACNC: 12.5 SEC — SIGNIFICANT CHANGE UP (ref 10–12.9)
RBC # BLD: 4.82 M/UL — SIGNIFICANT CHANGE UP (ref 4.2–5.8)
RBC # FLD: 13.2 % — SIGNIFICANT CHANGE UP (ref 10.3–14.5)
SODIUM SERPL-SCNC: 137 MMOL/L — SIGNIFICANT CHANGE UP (ref 135–145)
SPECIMEN SOURCE: SIGNIFICANT CHANGE UP
SPECIMEN SOURCE: SIGNIFICANT CHANGE UP
WBC # BLD: 14.88 K/UL — HIGH (ref 3.8–10.5)
WBC # FLD AUTO: 14.88 K/UL — HIGH (ref 3.8–10.5)

## 2019-08-10 PROCEDURE — 71045 X-RAY EXAM CHEST 1 VIEW: CPT | Mod: 26

## 2019-08-10 RX ORDER — SODIUM CHLORIDE 9 MG/ML
3 INJECTION INTRAMUSCULAR; INTRAVENOUS; SUBCUTANEOUS EVERY 8 HOURS
Refills: 0 | Status: DISCONTINUED | OUTPATIENT
Start: 2019-08-10 | End: 2019-08-12

## 2019-08-10 RX ORDER — MAGNESIUM OXIDE 400 MG ORAL TABLET 241.3 MG
800 TABLET ORAL ONCE
Refills: 0 | Status: COMPLETED | OUTPATIENT
Start: 2019-08-10 | End: 2019-08-10

## 2019-08-10 RX ORDER — SODIUM CHLORIDE 9 MG/ML
3 INJECTION INTRAMUSCULAR; INTRAVENOUS; SUBCUTANEOUS EVERY 6 HOURS
Refills: 0 | Status: DISCONTINUED | OUTPATIENT
Start: 2019-08-10 | End: 2019-08-12

## 2019-08-10 RX ORDER — ONDANSETRON 8 MG/1
4 TABLET, FILM COATED ORAL ONCE
Refills: 0 | Status: COMPLETED | OUTPATIENT
Start: 2019-08-10 | End: 2019-08-10

## 2019-08-10 RX ORDER — MORPHINE SULFATE 50 MG/1
2 CAPSULE, EXTENDED RELEASE ORAL ONCE
Refills: 0 | Status: DISCONTINUED | OUTPATIENT
Start: 2019-08-10 | End: 2019-08-10

## 2019-08-10 RX ADMIN — LIDOCAINE 1 PATCH: 4 CREAM TOPICAL at 13:06

## 2019-08-10 RX ADMIN — Medication 100 MILLIGRAM(S): at 06:17

## 2019-08-10 RX ADMIN — HEPARIN SODIUM 5000 UNIT(S): 5000 INJECTION INTRAVENOUS; SUBCUTANEOUS at 06:17

## 2019-08-10 RX ADMIN — HEPARIN SODIUM 5000 UNIT(S): 5000 INJECTION INTRAVENOUS; SUBCUTANEOUS at 13:06

## 2019-08-10 RX ADMIN — AMLODIPINE BESYLATE 2.5 MILLIGRAM(S): 2.5 TABLET ORAL at 06:17

## 2019-08-10 RX ADMIN — SODIUM CHLORIDE 3 MILLILITER(S): 9 INJECTION INTRAMUSCULAR; INTRAVENOUS; SUBCUTANEOUS at 05:14

## 2019-08-10 RX ADMIN — SODIUM CHLORIDE 3 MILLILITER(S): 9 INJECTION INTRAMUSCULAR; INTRAVENOUS; SUBCUTANEOUS at 21:19

## 2019-08-10 RX ADMIN — MORPHINE SULFATE 2 MILLIGRAM(S): 50 CAPSULE, EXTENDED RELEASE ORAL at 00:30

## 2019-08-10 RX ADMIN — Medication 250 MILLIGRAM(S): at 21:04

## 2019-08-10 RX ADMIN — MAGNESIUM OXIDE 400 MG ORAL TABLET 800 MILLIGRAM(S): 241.3 TABLET ORAL at 06:34

## 2019-08-10 RX ADMIN — LIDOCAINE 1 PATCH: 4 CREAM TOPICAL at 18:11

## 2019-08-10 RX ADMIN — Medication 250 MILLIGRAM(S): at 10:25

## 2019-08-10 RX ADMIN — ONDANSETRON 4 MILLIGRAM(S): 8 TABLET, FILM COATED ORAL at 07:00

## 2019-08-10 RX ADMIN — PANTOPRAZOLE SODIUM 40 MILLIGRAM(S): 20 TABLET, DELAYED RELEASE ORAL at 06:16

## 2019-08-10 RX ADMIN — SENNA PLUS 2 TABLET(S): 8.6 TABLET ORAL at 21:04

## 2019-08-10 RX ADMIN — MORPHINE SULFATE 2 MILLIGRAM(S): 50 CAPSULE, EXTENDED RELEASE ORAL at 01:00

## 2019-08-10 RX ADMIN — HEPARIN SODIUM 5000 UNIT(S): 5000 INJECTION INTRAVENOUS; SUBCUTANEOUS at 21:04

## 2019-08-10 RX ADMIN — Medication 100 MILLIGRAM(S): at 18:11

## 2019-08-10 NOTE — PROGRESS NOTE ADULT - SUBJECTIVE AND OBJECTIVE BOX
CTICU TO [facility] TRANSFER NOTE    Operation / Date: R VATS RLL wedge resection on 8/9/19    SUBJECTIVE ASSESSMENT:  53y Male seen at bedside this AM. Doing well, pain is well controlled, tolerating room air.  No BM at this time, +flatus.  Denies HA, AMS, CP, palpitations, SOB, cough, hemoptysis, n/v/d, fever.     Vital Signs Last 24 Hrs  T(C): 37.2 (10 Aug 2019 14:00), Max: 37.3 (09 Aug 2019 21:26)  T(F): 99 (10 Aug 2019 14:00), Max: 99.2 (09 Aug 2019 21:26)  HR: 84 (10 Aug 2019 15:00) (66 - 104)  BP: 154/68 (10 Aug 2019 15:00) (111/63 - 169/82)  BP(mean): 95 (10 Aug 2019 15:00) (80 - 113)  RR: 17 (10 Aug 2019 15:00) (10 - 24)  SpO2: 95% (10 Aug 2019 15:00) (94% - 100%)  I&O's Detail    09 Aug 2019 07:01  -  10 Aug 2019 07:00  --------------------------------------------------------  IN:    IV PiggyBack: 500 mL    Oral Fluid: 300 mL  Total IN: 800 mL    OUT:    Chest Tube: 25 mL    Voided: 2050 mL  Total OUT: 2075 mL    Total NET: -1275 mL      10 Aug 2019 07:01  -  10 Aug 2019 16:54  --------------------------------------------------------  IN:    IV PiggyBack: 500 mL    Oral Fluid: 240 mL  Total IN: 740 mL    OUT:    Voided: 750 mL  Total OUT: 750 mL    Total NET: -10 mL    CHEST TUBE:  No.   ZOHAIB DRAIN:  No.  EPICARDIAL WIRES: No.  TIE DOWNS: No.  LUA: No.     PHYSICAL EXAM:  General: OOB to chair, no acute distress.  Neurological: AAOx3, no AMS or focal deficits.   Cardiovascular: RRR, S1/S2, no m/r/g.   Respiratory: No acute distress on RA.  CTA b/l, no w/r/r.   Gastrointestinal: ND, NBS, non-TTP.   Extremities: Warm and well perfused, no calf ttp or edema b/l.   Vascular: Pulses 2+ throughout  Incision Sites: R VATS: C/D/I    LABS:                        14.8   14.88 )-----------( 249      ( 10 Aug 2019 05:38 )             43.1       COUMADIN:  No.    PT/INR - ( 10 Aug 2019 05:38 )   PT: 12.5 sec;   INR: 1.10          PTT - ( 10 Aug 2019 05:38 )  PTT:32.3 sec    08-10    137  |  100  |  13  ----------------------------<  188<H>  4.0   |  21<L>  |  0.81    Ca    9.1      10 Aug 2019 05:38  Phos  3.4     08-10  Mg     1.7     08-10    TPro  7.0  /  Alb  4.2  /  TBili  0.7  /  DBili  x   /  AST  31  /  ALT  65<H>  /  AlkPhos  66  08-10    MEDICATIONS  (STANDING):  amLODIPine   Tablet 2.5 milliGRAM(s) Oral daily  docusate sodium 100 milliGRAM(s) Oral two times a day  heparin  Injectable 5000 Unit(s) SubCutaneous every 8 hours  lidocaine   Patch 1 Patch Transdermal daily  pantoprazole    Tablet 40 milliGRAM(s) Oral before breakfast  senna 2 Tablet(s) Oral at bedtime  sodium chloride 0.9% lock flush 3 milliLiter(s) IV Push every 8 hours  vancomycin  IVPB 2000 milliGRAM(s) IV Intermittent every 12 hours    MEDICATIONS  (PRN):  acetaminophen   Tablet .. 650 milliGRAM(s) Oral every 6 hours PRN Mild Pain (1 - 3)  ALBUTerol    90 MICROgram(s) HFA Inhaler 2 Puff(s) Inhalation every 6 hours PRN Shortness of Breath and/or Wheezing  sodium chloride 3%  Inhalation 3 milliLiter(s) Inhalation every 6 hours PRN sputum      RADIOLOGY & ADDITIONAL TESTS:  < from: Xray Chest 1 View-PORTABLE IMMEDIATE (08.10.19 @ 10:25) >    EXAM:  XR CHEST PORTABLE IMMED 1V                          PROCEDURE DATE:  08/10/2019          INTERPRETATION:  Clinical History: Chest tube removal    Portable examination of the chest demonstrates progression discoid change   and/or infiltrate right lung base comparison to prior examination of the   chest 8/10/2019. Patient status post removal right chest tube. Right   apical pneumothorax noted.    Impression: Discoid change and/or infiltrate right lung base. Right   apical pneumothorax    < end of copied text >

## 2019-08-10 NOTE — PROGRESS NOTE ADULT - ASSESSMENT
53 year old male with history of HTN, asthma, renal stones diagnosed in 3/2019, incidentally found to have 9cm RLL nodule and 8mm EARL nodule on imaging at that time.  He was referred to Dr. Tsang for surgical evaluation and deemed a surgical candidate after completion of pre-op workup.  On 8/9/19, he was admitted to [facility] under the care of Dr. Tsang where he underwent R VATS robotic RLL wedge resection.  Intraoperatively, patient 53 year old male with history of HTN, asthma, renal stones diagnosed in 3/2019, incidentally found to have 9cm RLL nodule and 8mm EARL nodule on imaging at that time.  He was referred to Dr. Tsang for surgical evaluation and deemed a surgical candidate after completion of pre-op workup.  On 8/9/19, he was admitted to [facility] under the care of Dr. Tsang where he underwent R VATS robotic RLL wedge resection.  Intraoperatively, patient found to have granulomatous disease suggestive of TB and was transferred extubated post-operatively to CTICU with 1 chest tube in place for airborne isolation.  No acute issues overnight.  On POD 1, patient inadvertently removed CT in AM.  Repeat CXR performed showing small right apical ptx.  Stable on room air. Transferred to floor care that afternoon.     Neurovascular: No delirium, pain well managed on current regimen  -C/w PRNs for Pain control  -Monitor neuro status    Respiratory: Saturates well on room air.  POD 1 s/p R VATS robotic RLL wedge resection.   -Asthma: C/w home inhalers   -Airborne isolation to r/o TB.  AFB x 2 sent on 8/10, one more to be sent on 8/11.   -AM CXR stable, repeat after chest tube removal with small right apical ptx.  Repeat in PM, f/u results.   -Encourage IS 10x/hour while awake, Cough and deep breathing exercises  -Monitor respiratory status via SpO2  -Continue to wean NC as tolerated    Cardiovascular: History above.  -HTN: C/w home norvasc  -Monitor HR/BP/Tele    GI: Tolerating PO  -Prophylaxis: Protonix  -C/w bowel regimen    /Renal:   -Good out, +TOV  -BUN/Cr: 13/0.81  -Trend Cr on AM labs  -Replete electrolytes as needed    ID: Afebrile, asymptomatic  -WCC: 14.88- likely post-operative leukocytosis.  Continue to monitor.   -Continue to monitor for SIRS/Sepsis syndrome while inpatient    Endo: No acute issues.     Heme:   -H/H: 14/43  -CBC, chem in AM  -DVT ppx: HSQ 5000 u q8h and SCDs    Disposition: Home when medically appropriate.

## 2019-08-11 ENCOUNTER — TRANSCRIPTION ENCOUNTER (OUTPATIENT)
Age: 54
End: 2019-08-11

## 2019-08-11 LAB
ANION GAP SERPL CALC-SCNC: 10 MMOL/L — SIGNIFICANT CHANGE UP (ref 5–17)
BUN SERPL-MCNC: 18 MG/DL — SIGNIFICANT CHANGE UP (ref 7–23)
CALCIUM SERPL-MCNC: 8.9 MG/DL — SIGNIFICANT CHANGE UP (ref 8.4–10.5)
CHLORIDE SERPL-SCNC: 104 MMOL/L — SIGNIFICANT CHANGE UP (ref 96–108)
CO2 SERPL-SCNC: 25 MMOL/L — SIGNIFICANT CHANGE UP (ref 22–31)
CREAT SERPL-MCNC: 1.02 MG/DL — SIGNIFICANT CHANGE UP (ref 0.5–1.3)
CULTURE RESULTS: SIGNIFICANT CHANGE UP
GLUCOSE SERPL-MCNC: 166 MG/DL — HIGH (ref 70–99)
HCT VFR BLD CALC: 45 % — SIGNIFICANT CHANGE UP (ref 39–50)
HGB BLD-MCNC: 14.7 G/DL — SIGNIFICANT CHANGE UP (ref 13–17)
MAGNESIUM SERPL-MCNC: 1.9 MG/DL — SIGNIFICANT CHANGE UP (ref 1.6–2.6)
MCHC RBC-ENTMCNC: 30.4 PG — SIGNIFICANT CHANGE UP (ref 27–34)
MCHC RBC-ENTMCNC: 32.7 GM/DL — SIGNIFICANT CHANGE UP (ref 32–36)
MCV RBC AUTO: 93 FL — SIGNIFICANT CHANGE UP (ref 80–100)
NRBC # BLD: 0 /100 WBCS — SIGNIFICANT CHANGE UP (ref 0–0)
PLATELET # BLD AUTO: 233 K/UL — SIGNIFICANT CHANGE UP (ref 150–400)
POTASSIUM SERPL-MCNC: 4 MMOL/L — SIGNIFICANT CHANGE UP (ref 3.5–5.3)
POTASSIUM SERPL-SCNC: 4 MMOL/L — SIGNIFICANT CHANGE UP (ref 3.5–5.3)
RBC # BLD: 4.84 M/UL — SIGNIFICANT CHANGE UP (ref 4.2–5.8)
RBC # FLD: 13.9 % — SIGNIFICANT CHANGE UP (ref 10.3–14.5)
SODIUM SERPL-SCNC: 139 MMOL/L — SIGNIFICANT CHANGE UP (ref 135–145)
SPECIMEN SOURCE: SIGNIFICANT CHANGE UP
WBC # BLD: 11.04 K/UL — HIGH (ref 3.8–10.5)
WBC # FLD AUTO: 11.04 K/UL — HIGH (ref 3.8–10.5)

## 2019-08-11 PROCEDURE — 71045 X-RAY EXAM CHEST 1 VIEW: CPT | Mod: 26

## 2019-08-11 RX ORDER — MAGNESIUM OXIDE 400 MG ORAL TABLET 241.3 MG
800 TABLET ORAL ONCE
Refills: 0 | Status: COMPLETED | OUTPATIENT
Start: 2019-08-11 | End: 2019-08-11

## 2019-08-11 RX ADMIN — Medication 100 MILLIGRAM(S): at 05:48

## 2019-08-11 RX ADMIN — AMLODIPINE BESYLATE 2.5 MILLIGRAM(S): 2.5 TABLET ORAL at 05:47

## 2019-08-11 RX ADMIN — HEPARIN SODIUM 5000 UNIT(S): 5000 INJECTION INTRAVENOUS; SUBCUTANEOUS at 21:39

## 2019-08-11 RX ADMIN — LIDOCAINE 1 PATCH: 4 CREAM TOPICAL at 23:55

## 2019-08-11 RX ADMIN — SODIUM CHLORIDE 3 MILLILITER(S): 9 INJECTION INTRAMUSCULAR; INTRAVENOUS; SUBCUTANEOUS at 05:53

## 2019-08-11 RX ADMIN — SODIUM CHLORIDE 3 MILLILITER(S): 9 INJECTION INTRAMUSCULAR; INTRAVENOUS; SUBCUTANEOUS at 21:40

## 2019-08-11 RX ADMIN — Medication 650 MILLIGRAM(S): at 06:30

## 2019-08-11 RX ADMIN — Medication 100 MILLIGRAM(S): at 17:04

## 2019-08-11 RX ADMIN — HEPARIN SODIUM 5000 UNIT(S): 5000 INJECTION INTRAVENOUS; SUBCUTANEOUS at 05:48

## 2019-08-11 RX ADMIN — PANTOPRAZOLE SODIUM 40 MILLIGRAM(S): 20 TABLET, DELAYED RELEASE ORAL at 05:47

## 2019-08-11 RX ADMIN — LIDOCAINE 1 PATCH: 4 CREAM TOPICAL at 18:06

## 2019-08-11 RX ADMIN — Medication 650 MILLIGRAM(S): at 05:48

## 2019-08-11 RX ADMIN — LIDOCAINE 1 PATCH: 4 CREAM TOPICAL at 11:31

## 2019-08-11 RX ADMIN — SODIUM CHLORIDE 3 MILLILITER(S): 9 INJECTION INTRAMUSCULAR; INTRAVENOUS; SUBCUTANEOUS at 13:21

## 2019-08-11 RX ADMIN — LIDOCAINE 1 PATCH: 4 CREAM TOPICAL at 01:04

## 2019-08-11 RX ADMIN — MAGNESIUM OXIDE 400 MG ORAL TABLET 800 MILLIGRAM(S): 241.3 TABLET ORAL at 08:51

## 2019-08-11 RX ADMIN — HEPARIN SODIUM 5000 UNIT(S): 5000 INJECTION INTRAVENOUS; SUBCUTANEOUS at 13:21

## 2019-08-11 RX ADMIN — SENNA PLUS 2 TABLET(S): 8.6 TABLET ORAL at 21:39

## 2019-08-11 NOTE — DISCHARGE NOTE PROVIDER - HOSPITAL COURSE
This is a 53 year old male with history of HTN, asthma, renal stones diagnosed in 3/2019, incidentally found to have 9cm RLL nodule and 8mm EARL nodule on imaging at that time.  He was referred to Dr. Tsang for surgical evaluation and deemed a surgical candidate after completion of pre-op workup.  On 8/9/19, he was admitted to [facility] under the care of Dr. Tsang where he underwent R VATS robotic RLL wedge resection.  Intraoperatively, patient found to have granulomatous disease suggestive of TB and was transferred extubated post-operatively to CTICU with 1 chest tube in place for airborne isolation.  No acute issues overnight.  On POD 1, patient inadvertently removed CT in AM.  Repeat CXR performed showing small right apical ptx with stable airspace demonstrated on repeat CXR that afternoon.  He remained HD stable and asymptomatic overnight with repeat CXR again on POD 2 showing stable right apical ptx.  AFB sputum cultures obtained x 3, negative.  Discussed with Dr. Tsang, medically cleared for discharge to home on POD 2 with plan for outpatient follow-up.  Over 30 minutes was spent with the patient reviewing the discharge material including medications, follow up appointments, recovery, concerning symptoms, and how to contact their health care providers if they have questions. This is a 53 year old male with history of HTN, asthma, renal stones diagnosed in 3/2019, incidentally found to have 9cm RLL nodule and 8mm EARL nodule on imaging at that time.  He was referred to Dr. Timi Tsang, Thoracic Surgery, for surgical evaluation and deemed a surgical candidate after completion of pre-op workup.  On 8/9/19, he was admitted to [facility] under the care of Dr. Tsang where he underwent R VATS robotic assisted RLL wedge resection.  Intraoperatively, patient found to have granulomatous disease suggestive of TB and was transferred, extubated, post-operatively to CTICU with 1 chest tube in place for airborne isolation.  No acute issues overnight.  On POD 1, patient inadvertently removed CT in AM.  Repeat CXR performed showed small right apical pneumothorax with stable airspace demonstrated on repeat CXR that afternoon.  He remained hemodynamically stable and asymptomatic overnight with repeat CXR again on POD 2 showing stable right apical ptx.  Acid Fast Bacilli sputum cultures obtained x 3, negative.  Discussed with Dr. Tsang, medically cleared for discharge to home on POD 3 with plan for outpatient follow-up.  Over 30 minutes was spent with the patient reviewing the discharge material including medications, follow up appointments, recovery, concerning symptoms, and how to contact their health care providers if they have questions.

## 2019-08-11 NOTE — PROGRESS NOTE ADULT - ASSESSMENT
53 year old male with history of HTN, asthma, renal stones diagnosed in 3/2019, incidentally found to have 9cm RLL nodule and 8mm EARL nodule on imaging at that time.  He was referred to Dr. Tsang for surgical evaluation and deemed a surgical candidate after completion of pre-op workup.  On 8/9/19, he was admitted to [facility] under the care of Dr. Tsang where he underwent R VATS robotic RLL wedge resection.  Intraoperatively, patient found to have granulomatous disease suggestive of TB and was transferred extubated post-operatively to CTICU with 1 chest tube in place for airborne isolation.  No acute issues overnight.  On POD 1, patient inadvertently removed CT in AM.  Repeat CXR performed showing small right apical ptx.  Stable on room air. Transferred to floor care that afternoon. Now POD 2, remained on non-rebreather overnight. Otherwise, no acute issues.     Neurovascular: No delirium, pain well managed on current regimen  -C/w PRNs for Pain control  -Monitor neuro status    Respiratory: Saturates well on room air.  POD 2 s/p R VATS robotic RLL wedge resection.   -Asthma: C/w home inhalers   -Airborne isolation to r/o TB.  AFB x 3 sent and received by lab.  Awaiting final result from third sputum prior to clearance for discharge.    -AM CXR stable, repeat after chest tube removal with small right apical ptx.  Repeat in PM, f/u results.   -Encourage IS 10x/hour while awake, Cough and deep breathing exercises  -Monitor respiratory status via SpO2  -Continue to wean NC as tolerated    Cardiovascular: History above.  -HTN: C/w home norvasc  -Monitor HR/BP/Tele    GI: Tolerating PO  -Prophylaxis: Protonix  -C/w bowel regimen    /Renal:   -Good out, +TOV  -BUN/Cr: 18/1.02  -Trend Cr on AM labs  -Replete electrolytes as needed    ID: Afebrile, asymptomatic  -WCC: 11- trending down  -Continue to monitor for SIRS/Sepsis syndrome while inpatient    Endo: No acute issues.     Heme:   -H/H: 14/45  -CBC, chem in AM  -DVT ppx: HSQ 5000 u q8h and SCDs    Disposition: Home when medically appropriate.

## 2019-08-11 NOTE — DISCHARGE NOTE PROVIDER - CARE PROVIDER_API CALL
Timi Tsang (MD)  Surgery; Thoracic Surgery  [address] [place] [address]  Phone: [phone]  Fax: [phone]  Follow Up Time:

## 2019-08-11 NOTE — DISCHARGE NOTE PROVIDER - CARE PROVIDERS DIRECT ADDRESSES
[email or web address] + fever and no chills.

## 2019-08-11 NOTE — DISCHARGE NOTE PROVIDER - NSDCFUADDINST_GEN_ALL_CORE_FT
-Walk daily as tolerated and use your incentive spirometer every hour.    -Gently clean your incisions with anti-bacterial soap and water, pat  dry.  You may leave them open to air.    -Call your doctor if you have shortness of breath, chest pain not  relieved by pain medication, dizziness, fever >101.5, or increased  redness or drainage from incisions. -Walk daily as tolerated and use your incentive spirometer every hour.  -Gently clean your incisions with anti-bacterial soap and water, pat  dry.  You may leave them open to air.  -Call your doctor if you have shortness of breath, chest pain not  relieved by pain medication, dizziness, fever >101.5, or increased  redness or drainage from incisions.

## 2019-08-11 NOTE — PROGRESS NOTE ADULT - SUBJECTIVE AND OBJECTIVE BOX
Patient discussed on morning rounds with Dr. Wilson      Operation / Date:  R VATS RLL wedge resection on 8/9/19    SUBJECTIVE ASSESSMENT:  53y Male seen at bedside this AM.  Doing well, pain is well controlled.  Tolerating room air.  Denies HA, AMS, CP, palpitations, SOB, cough, hemoptysis, n/v/d, fever.     Vital Signs Last 24 Hrs  T(C): 36.2 (11 Aug 2019 05:00), Max: 37.2 (10 Aug 2019 14:00)  T(F): 97.1 (11 Aug 2019 05:00), Max: 99 (10 Aug 2019 14:00)  HR: 62 (11 Aug 2019 12:29) (62 - 106)  BP: 137/- (11 Aug 2019 12:29) (124/60 - 164/83)  BP(mean): 99 (11 Aug 2019 12:29) (83 - 101)  RR: 16 (11 Aug 2019 12:29) (16 - 20)  SpO2: 100% (11 Aug 2019 12:29) (94% - 100%)  I&O's Detail    10 Aug 2019 07:01  -  11 Aug 2019 07:00  --------------------------------------------------------  IN:    IV PiggyBack: 1000 mL    Oral Fluid: 600 mL  Total IN: 1600 mL    OUT:    Voided: 2470 mL  Total OUT: 2470 mL    Total NET: -870 mL      11 Aug 2019 07:01  -  11 Aug 2019 12:30  --------------------------------------------------------  IN:  Total IN: 0 mL    OUT:    Voided: 350 mL  Total OUT: 350 mL    Total NET: -350 mL    CHEST TUBE:  No.   ZOHAIB DRAIN:  No.  EPICARDIAL WIRES: No.  TIE DOWNS: No.  LUA: No.     PHYSICAL EXAM:  General: OOB to chair, no acute distress.  Neurological: AAOx3, no AMS or focal deficits.   Cardiovascular: RRR, S1/S2, no m/r/g.   Respiratory: No acute distress on RA.  CTA b/l, no w/r/r.   Gastrointestinal: ND, NBS, non-TTP.   Extremities: Warm and well perfused, no calf ttp or edema b/l.   Vascular: Pulses 2+ throughout  Incision Sites: R VATS: C/D/I    LABS:                        14.7   11.04 )-----------( 233      ( 11 Aug 2019 06:57 )             45.0     COUMADIN:  No.    PT/INR - ( 10 Aug 2019 05:38 )   PT: 12.5 sec;   INR: 1.10          PTT - ( 10 Aug 2019 05:38 )  PTT:32.3 sec    08-11    139  |  104  |  18  ----------------------------<  166<H>  4.0   |  25  |  1.02    Ca    8.9      11 Aug 2019 06:57  Phos  3.4     08-10  Mg     1.9     08-11    TPro  7.0  /  Alb  4.2  /  TBili  0.7  /  DBili  x   /  AST  31  /  ALT  65<H>  /  AlkPhos  66  08-10    MEDICATIONS  (STANDING):  amLODIPine   Tablet 2.5 milliGRAM(s) Oral daily  docusate sodium 100 milliGRAM(s) Oral two times a day  heparin  Injectable 5000 Unit(s) SubCutaneous every 8 hours  lidocaine   Patch 1 Patch Transdermal daily  pantoprazole    Tablet 40 milliGRAM(s) Oral before breakfast  senna 2 Tablet(s) Oral at bedtime  sodium chloride 0.9% lock flush 3 milliLiter(s) IV Push every 8 hours    MEDICATIONS  (PRN):  acetaminophen   Tablet .. 650 milliGRAM(s) Oral every 6 hours PRN Mild Pain (1 - 3)  ALBUTerol    90 MICROgram(s) HFA Inhaler 2 Puff(s) Inhalation every 6 hours PRN Shortness of Breath and/or Wheezing  sodium chloride 3%  Inhalation 3 milliLiter(s) Inhalation every 6 hours PRN sputum      RADIOLOGY & ADDITIONAL TESTS:  < from: Xray Chest 1 View- PORTABLE-Routine (08.11.19 @ 07:20) >    EXAM:  XR CHEST PORTABLE ROUTINE 1V                          PROCEDURE DATE:  08/11/2019          INTERPRETATION:  Clinical History: Postop    Portable examination of the chest demonstrates improvement discoid change   and/or infiltrate right lung base. Right apical pneumothorax noted. The   heart is within normal limits in transverse diameter.    Impression: Right apical pneumothorax. Improvement discoid change and/or   infiltrate right lung base    < end of copied text >

## 2019-08-11 NOTE — DISCHARGE NOTE PROVIDER - NSDCCPTREATMENT_GEN_ALL_CORE_FT
----- Message from Ally Roblero sent at 11/27/2018  9:07 AM CST -----  Contact: [facility] refill: [phone].    albuterol (PROVENTIL) 2.5 mg /3 mL (0.083 %) nebulizer solution    Barrington Wiggins   
PRINCIPAL PROCEDURE  Procedure: Wedge resection, lung  Findings and Treatment: R VATS, RA RLL wedge resection

## 2019-08-11 NOTE — DISCHARGE NOTE PROVIDER - NSDCCPCAREPLAN_GEN_ALL_CORE_FT
PRINCIPAL DISCHARGE DIAGNOSIS  Diagnosis: Lung nodule  Assessment and Plan of Treatment: MEDICATIONS:  It is very important that you continue your medications as prescribed after you are discharged.  You should refer to the medication reconciliation form provided to you on your discharge to information as to what medications to take and when.    Additionally, your medications have been sent to a pharmacy.  These medications should be picked up from your pharmacy on the DAY OF DISCHARGE.  This helps to ensure that you have the appropriate medications available while at home.  If you have any issues obtaining your medications on the day of your discharge, please call [phone] for further assistance.   SURGICAL DRESSING:  Please keep your chest tube bandage clean dry and intact. You should keep your dressing on until you see Dr. Tsang in the office, at which time it will be removed.

## 2019-08-11 NOTE — DISCHARGE NOTE PROVIDER - NSDCFUADDAPPT_GEN_ALL_CORE_FT
Please follow-up with Dr. Verma within 1 week of your discharge.  Because you were discharged over the weekend, we were unable to confirm your discharge appointment.  PLEASE CALL DR. VERMA'S OFFICE AT THE INFORMATION BELOW ON MONDAY, 8/12/19, TO SCHEDULE AN APPOINTMENT FOR A FOLLOW-UP ON THE FOLLOWING FRIDAY, 8/16/19.   Dr. Timi Verma  [facility]   Department of Cardiovascular and Thoracic Surgery  [facility] [address] [place] [address] [phone]    Please follow-up with your primary care provider within 2-4 weeks of discharge to discuss your hospital stay. - Please follow-up with Dr. Tsang on 8/16/19 at 9:45AM. Information below:  Dr. Timi Tsang  [facility]   Department of Cardiovascular and Thoracic Surgery  [facility] Fourth Floor  [address] [place] [address] [phone] - call us with any questions.   - Please also follow up with Dr. Ronaldo Ferro (referring MD) on 9/17/19 at 5:00PM to discuss your hospital stay and to guide your recovery from surgery.

## 2019-08-11 NOTE — PROVIDER CONTACT NOTE (OTHER) - ACTION/TREATMENT ORDERED:
JUAN Laird stated pt doesn't need nonrebreather mask tonight unless pt shows signs that he needs it.

## 2019-08-12 ENCOUNTER — TRANSCRIPTION ENCOUNTER (OUTPATIENT)
Age: 54
End: 2019-08-12

## 2019-08-12 VITALS
SYSTOLIC BLOOD PRESSURE: 148 MMHG | HEART RATE: 79 BPM | DIASTOLIC BLOOD PRESSURE: 72 MMHG | OXYGEN SATURATION: 96 % | RESPIRATION RATE: 16 BRPM | TEMPERATURE: 98 F

## 2019-08-12 LAB
CULTURE RESULTS: NO GROWTH — SIGNIFICANT CHANGE UP
NIGHT BLUE STAIN TISS: SIGNIFICANT CHANGE UP
NIGHT BLUE STAIN TISS: SIGNIFICANT CHANGE UP
SPECIMEN SOURCE: SIGNIFICANT CHANGE UP

## 2019-08-12 PROCEDURE — 71045 X-RAY EXAM CHEST 1 VIEW: CPT | Mod: 26

## 2019-08-12 RX ORDER — ACETAMINOPHEN 500 MG
2 TABLET ORAL
Qty: 0 | Refills: 0 | DISCHARGE
Start: 2019-08-12

## 2019-08-12 RX ORDER — ACETAMINOPHEN 500 MG
2 TABLET ORAL
Qty: 40 | Refills: 0
Start: 2019-08-12 | End: 2019-08-16

## 2019-08-12 RX ADMIN — SODIUM CHLORIDE 3 MILLILITER(S): 9 INJECTION INTRAMUSCULAR; INTRAVENOUS; SUBCUTANEOUS at 05:53

## 2019-08-12 RX ADMIN — PANTOPRAZOLE SODIUM 40 MILLIGRAM(S): 20 TABLET, DELAYED RELEASE ORAL at 05:51

## 2019-08-12 RX ADMIN — AMLODIPINE BESYLATE 2.5 MILLIGRAM(S): 2.5 TABLET ORAL at 05:50

## 2019-08-12 RX ADMIN — Medication 100 MILLIGRAM(S): at 05:51

## 2019-08-12 RX ADMIN — HEPARIN SODIUM 5000 UNIT(S): 5000 INJECTION INTRAVENOUS; SUBCUTANEOUS at 05:51

## 2019-08-12 RX ADMIN — LIDOCAINE 1 PATCH: 4 CREAM TOPICAL at 12:00

## 2019-08-12 NOTE — PROGRESS NOTE ADULT - ASSESSMENT
- Please follow-up with Dr. Tsang on 8/16/19 at 9:45AM. Information below:  Dr. Timi Tsang  [facility]   Department of Cardiovascular and Thoracic Surgery  [facility] Fourth Floor  [address] [address] [phone] - call us with any questions.   - Please also follow up with Dr. Ronaldo Ferro (referring MD) on 9/17/19 at 5:00PM to discuss your hospital stay and to guide your recovery from surgery.  Driving allowed  -Walk daily as tolerated and use your incentive spirometer every hour.  -Gently clean your incisions with anti-bacterial soap and water, pat  dry.  You may leave them open to air.  -Call your doctor if you have shortness of breath, chest pain not  relieved by pain medication, dizziness, fever >101.5, or increased  redness or drainage from incisions.

## 2019-08-12 NOTE — DISCHARGE NOTE NURSING/CASE MANAGEMENT/SOCIAL WORK - NSDCDPATPORTLINK_GEN_ALL_CORE
You can access the [facility] Patient Portal, offered by [facility], by registering with the following website: [email or web address]

## 2019-08-12 NOTE — DISCHARGE NOTE NURSING/CASE MANAGEMENT/SOCIAL WORK - NSDCPEEMAIL_GEN_ALL_CORE
[facility] email [email or web address]

## 2019-08-12 NOTE — DISCHARGE NOTE NURSING/CASE MANAGEMENT/SOCIAL WORK - NSDCPEWEB_GEN_ALL_CORE
[facility] for Tobacco [facility] website --- [email or web address] --- [email or web address]

## 2019-08-12 NOTE — DISCHARGE NOTE NURSING/CASE MANAGEMENT/SOCIAL WORK - NSDCFUADDAPPT_GEN_ALL_CORE_FT
- Please follow-up with Dr. Tsang on 8/16/19 at 9:45AM. Information below:  Dr. Timi Tsang  [facility]   Department of Cardiovascular and Thoracic Surgery  [facility] Fourth Floor  [address] [place] [address] [phone] - call us with any questions.   - Please also follow up with Dr. Ronaldo Ferro (referring MD) on 9/17/19 at 5:00PM to discuss your hospital stay and to guide your recovery from surgery.

## 2019-08-12 NOTE — PROGRESS NOTE ADULT - SUBJECTIVE AND OBJECTIVE BOX
Patient discussed on morning rounds with Dr. Wilson and over the phone with Dr. Tsang    Operation / Date:  R VATS, RA, RLL wedge resection on 8/9/19    Surgeon: Dr. Tsang    Referring Physician: Dr. Ronaldo Frero    SUBJECTIVE ASSESSMENT  53y Male seen and examined bedside. Patient is not offering any acute complaints and states he feels ready for discharge today. Denies chest pain, SOB, palpitations, hemopytsis, N/V/D, abdominal pain, dizziness, fever or chills. Patient is ambulating, tolerating PO diet, and voiding spontaneously. He is using IS, pulling 3500cc and is moving his bowels postoperatively.      AND HOSPITAL COURSE:  This is a 53 year old male with history of HTN, asthma, renal stones diagnosed in 3/2019, incidentally found to have 9cm RLL nodule and 8mm EARL nodule on imaging at that time.  He was referred to Dr. Timi Tsang, Thoracic Surgery, for surgical evaluation and deemed a surgical candidate after completion of pre-op workup.  On 8/9/19, he was admitted to [facility] under the care of Dr. Tsang where he underwent R VATS robotic assisted RLL wedge resection.  Intraoperatively, patient found to have granulomatous disease suggestive of TB and was transferred, extubated, post-operatively to CTICU with 1 chest tube in place for airborne isolation.  No acute issues overnight.  On POD 1, patient inadvertently removed CT in AM.  Repeat CXR performed showed small right apical pneumothorax with stable airspace demonstrated on repeat CXR that afternoon.  He remained hemodynamically stable and asymptomatic overnight with repeat CXR again on POD 2 showing stable right apical ptx.  Acid Fast Bacilli sputum cultures obtained x 3, negative.  Discussed with Dr. Tsang, medically cleared for discharge to home on POD 3 with plan for outpatient follow-up.  Over 30 minutes was spent with the patient reviewing the discharge material including medications, follow up appointments, recovery, concerning symptoms, and how to contact their health care providers if they have questions.      Vital Signs Last 24 Hrs  T(C): 36.9 (12 Aug 2019 12:07), Max: 37.4 (11 Aug 2019 20:38)  T(F): 98.4 (12 Aug 2019 12:07), Max: 99.3 (11 Aug 2019 20:38)  HR: 79 (12 Aug 2019 12:07) (73 - 93)  BP: 148/72 (12 Aug 2019 12:07) (118/66 - 148/72)  BP(mean): 103 (12 Aug 2019 12:07) (93 - 103)  RR: 16 (12 Aug 2019 12:07) (16 - 18)  SpO2: 96% (12 Aug 2019 12:07) (96% - 98%)    EPICARDIAL WIRES REMOVED:N/a.  TIE DOWNS REMOVED: No tiedown at chest tube site.    PHYSICAL EXAM:    General: Patient OOBTC, no acute distress     Neurological: Alert and oriented. No focal neurological deficits     Cardiovascular: S1S2, RRR, no murmurs appreciated on exam     Respiratory: Clear to ausculation bilaterally, no w/r/r    Gastrointestinal: Abdomen soft, non tender, non distended     Extremities: Warm and well perfused. No edema or calf tenderness     Vascular: Peripheral pulses 2+ b/l.    Incision Sites: R VATS: c/d/i, chest tube site is clean dry and almost healed, no tiedown.     LABS:                        14.7   11.04 )-----------( 233      ( 11 Aug 2019 06:57 )             45.0       COUMADIN:  No.                08-11    139  |  104  |  18  ----------------------------<  166<H>  4.0   |  25  |  1.02    Ca    8.9      11 Aug 2019 06:57  Mg     1.9     08-11            Discharge CXR: < from: Xray Chest 1 View- PORTABLE-Routine (08.11.19 @ 07:20) >    EXAM:  XR CHEST PORTABLE ROUTINE 1V                          PROCEDURE DATE:  08/11/2019          INTERPRETATION:  Clinical History: Postop    Portable examination of the chest demonstrates improvement discoid change   and/or infiltrate right lung base. Right apical pneumothorax noted. The   heart is within normal limits in transverse diameter.    Impression: Right apical pneumothorax. Improvement discoid change and/or   infiltrate right lung base    < end of copied text >      Discharge ECHO:

## 2019-08-13 LAB
GAMMA INTERFERON BACKGROUND BLD IA-ACNC: 0.02 IU/ML — SIGNIFICANT CHANGE UP
M TB IFN-G BLD-IMP: ABNORMAL
M TB IFN-G CD4+ BCKGRND COR BLD-ACNC: 0 IU/ML — SIGNIFICANT CHANGE UP
M TB IFN-G CD4+CD8+ BCKGRND COR BLD-ACNC: 0 IU/ML — SIGNIFICANT CHANGE UP
QUANT TB PLUS MITOGEN MINUS NIL: 0.06 IU/ML — SIGNIFICANT CHANGE UP

## 2019-08-13 PROCEDURE — 71045 X-RAY EXAM CHEST 1 VIEW: CPT

## 2019-08-13 PROCEDURE — 88312 SPECIAL STAINS GROUP 1: CPT

## 2019-08-13 PROCEDURE — 87116 MYCOBACTERIA CULTURE: CPT

## 2019-08-13 PROCEDURE — 88307 TISSUE EXAM BY PATHOLOGIST: CPT

## 2019-08-13 PROCEDURE — 36415 COLL VENOUS BLD VENIPUNCTURE: CPT

## 2019-08-13 PROCEDURE — 87070 CULTURE OTHR SPECIMN AEROBIC: CPT

## 2019-08-13 PROCEDURE — 85730 THROMBOPLASTIN TIME PARTIAL: CPT

## 2019-08-13 PROCEDURE — S2900: CPT

## 2019-08-13 PROCEDURE — 86900 BLOOD TYPING SEROLOGIC ABO: CPT

## 2019-08-13 PROCEDURE — 87206 SMEAR FLUORESCENT/ACID STAI: CPT

## 2019-08-13 PROCEDURE — 86850 RBC ANTIBODY SCREEN: CPT

## 2019-08-13 PROCEDURE — 87015 SPECIMEN INFECT AGNT CONCNTJ: CPT

## 2019-08-13 PROCEDURE — 80053 COMPREHEN METABOLIC PANEL: CPT

## 2019-08-13 PROCEDURE — 84100 ASSAY OF PHOSPHORUS: CPT

## 2019-08-13 PROCEDURE — 86901 BLOOD TYPING SEROLOGIC RH(D): CPT

## 2019-08-13 PROCEDURE — 94640 AIRWAY INHALATION TREATMENT: CPT

## 2019-08-13 PROCEDURE — 80048 BASIC METABOLIC PNL TOTAL CA: CPT

## 2019-08-13 PROCEDURE — 86480 TB TEST CELL IMMUN MEASURE: CPT

## 2019-08-13 PROCEDURE — 83735 ASSAY OF MAGNESIUM: CPT

## 2019-08-13 PROCEDURE — 87102 FUNGUS ISOLATION CULTURE: CPT

## 2019-08-13 PROCEDURE — 87075 CULTR BACTERIA EXCEPT BLOOD: CPT

## 2019-08-13 PROCEDURE — 88331 PATH CONSLTJ SURG 1 BLK 1SPC: CPT

## 2019-08-13 PROCEDURE — 85610 PROTHROMBIN TIME: CPT

## 2019-08-13 PROCEDURE — 85027 COMPLETE CBC AUTOMATED: CPT

## 2019-08-13 PROCEDURE — C1889: CPT

## 2019-08-14 PROBLEM — K21.9 GASTRO-ESOPHAGEAL REFLUX DISEASE WITHOUT ESOPHAGITIS: Chronic | Status: ACTIVE | Noted: 2019-08-08

## 2019-08-14 PROBLEM — H83.09 LABYRINTHITIS, UNSPECIFIED EAR: Chronic | Status: ACTIVE | Noted: 2019-08-08

## 2019-08-14 PROBLEM — I10 ESSENTIAL (PRIMARY) HYPERTENSION: Chronic | Status: ACTIVE | Noted: 2019-08-08

## 2019-08-14 PROBLEM — M19.90 UNSPECIFIED OSTEOARTHRITIS, UNSPECIFIED SITE: Chronic | Status: ACTIVE | Noted: 2019-08-08

## 2019-08-14 LAB — SURGICAL PATHOLOGY STUDY: SIGNIFICANT CHANGE UP

## 2019-08-15 ENCOUNTER — FORM ENCOUNTER (OUTPATIENT)
Age: 54
End: 2019-08-15

## 2019-08-15 DIAGNOSIS — K21.9 GASTRO-ESOPHAGEAL REFLUX DISEASE WITHOUT ESOPHAGITIS: ICD-10-CM

## 2019-08-15 DIAGNOSIS — R91.1 SOLITARY PULMONARY NODULE: ICD-10-CM

## 2019-08-15 DIAGNOSIS — I10 ESSENTIAL (PRIMARY) HYPERTENSION: ICD-10-CM

## 2019-08-15 DIAGNOSIS — R91.8 OTHER NONSPECIFIC ABNORMAL FINDING OF LUNG FIELD: ICD-10-CM

## 2019-08-15 DIAGNOSIS — J45.909 UNSPECIFIED ASTHMA, UNCOMPLICATED: ICD-10-CM

## 2019-08-15 DIAGNOSIS — L40.9 PSORIASIS, UNSPECIFIED: ICD-10-CM

## 2019-08-15 DIAGNOSIS — Z88.0 ALLERGY STATUS TO PENICILLIN: ICD-10-CM

## 2019-08-16 ENCOUNTER — APPOINTMENT (OUTPATIENT)
Dept: THORACIC SURGERY | Facility: CLINIC | Age: 54
End: 2019-08-16
Payer: COMMERCIAL

## 2019-08-16 ENCOUNTER — OUTPATIENT (OUTPATIENT)
Dept: OUTPATIENT SERVICES | Facility: HOSPITAL | Age: 54
LOS: 1 days | End: 2019-08-16
Payer: COMMERCIAL

## 2019-08-16 VITALS
RESPIRATION RATE: 19 BRPM | SYSTOLIC BLOOD PRESSURE: 138 MMHG | HEART RATE: 70 BPM | DIASTOLIC BLOOD PRESSURE: 80 MMHG | HEIGHT: 70 IN | OXYGEN SATURATION: 98 % | TEMPERATURE: 97.6 F | BODY MASS INDEX: 33.36 KG/M2 | WEIGHT: 233 LBS

## 2019-08-16 DIAGNOSIS — Z41.9 ENCOUNTER FOR PROCEDURE FOR PURPOSES OTHER THAN REMEDYING HEALTH STATE, UNSPECIFIED: Chronic | ICD-10-CM

## 2019-08-16 PROCEDURE — 71046 X-RAY EXAM CHEST 2 VIEWS: CPT | Mod: 26

## 2019-08-16 PROCEDURE — 99024 POSTOP FOLLOW-UP VISIT: CPT

## 2019-08-16 PROCEDURE — 71046 X-RAY EXAM CHEST 2 VIEWS: CPT

## 2019-08-22 NOTE — ADDENDUM
[FreeTextEntry1] : 8/22/19:  Lung nodule resected revealed cryptococcus, saw ID, awaiting further testing.  Recovering from surgery.  Also reviewed urine hormone testing with pt, nl cortisol, nl epinephrine and norepinephrine, dopamine level elevated to 771, referred to endocrine for eval.

## 2019-08-22 NOTE — RESULTS/DATA
[] : results reviewed [Normal] : The 12 - lead ECG is normal [NSR] : normal sinus rhythm [P Waves Normal] : the P wave is normal [Normal ST Segments] : the ST segments are normal [Incomplete RBBB] : incomplete right bundle branch block [No Interval Change] : no interval change [de-identified] : nl CBC [de-identified] : nl coags (minimally elevated PTT not clinically significant) [de-identified] : nl CMP except ALT 61 chronic and not clinically significant for surgery; UA showing microscopic hematuria but nl WBC and no bacteria (also not clinically significant for surgery)

## 2019-08-22 NOTE — ASSESSMENT
[High Risk Surgery - Intraperitoneal, Intrathoracic or Supringuinal Vascular Procedures] : High Risk Surgery - Intraperitoneal, Intrathoracic or Supringuinal Vascular Procedures - Yes (1) [Ischemic Heart Disease] : Ischemic Heart Disease - No (0) [Congestive Heart Failure] : Congestive Heart Failure - No (0) [Prior Cerebrovascular Accident or TIA] : Prior Cerebrovascular Accident or TIA - No (0) [Creatinine >= 2mg/dL (1 Point)] : Creatinine >= 2mg/dL - No (0) [Insulin-dependent Diabetic (1 Point)] : Insulin-dependent Diabetic - No (0) [1] : 1 , RCRI Class: II, Risk of Post-Op Cardiac Complications: 0.9%, Procedure Risk: Low-Risk [Patient Optimized for Surgery] : Patient optimized for surgery [FreeTextEntry4] : 52 yo man with h/o as above including mild intermittent asthma, HTN, psoriasis, adrenal nodule (undergoing evaluation), nephrolithiasis, microscopic hematuria (to be re-evaluated at future visit), newly found lung nodules, here for preop evaluation prior to RVATS with resection of lung nodule and possible lobectomy in 2 days.  No active cardiac conditions, good exercise tolerance without symptoms, nl ECG (except possible incomplete RBBB, not clinically significant), acceptable labs.  Pt is low risk candidate for intermediate risk procedure with no further w/up required prior to surgery and no contraindication to proceeding with planned surgery.  Should hold NSAIDS until surgery.

## 2019-08-22 NOTE — HISTORY OF PRESENT ILLNESS
[FreeTextEntry1] : RVATs, resection of lung nodule and possible lobectomy [FreeTextEntry2] : 8/9/19 [FreeTextEntry3] : Dr. Timi Tsang at [facility], overnight surgery [FreeTextEntry4] : 52 yo man with h/o as below including mild intermittent asthma, HTN, psoriasis, adrenal nodule, nephrolithiasis, newly found lung nodules, here for preop evaluation prior to RVATS with resection of lung nodule and possible lobectomy in 2 days.\par Feeling well overall, no complaints.  No chest pain, no shortness of breath, no dizziness or lightheadedness, occasional palpitations but believes related to anxiety.  Never had tb or positive tb test previously.\par Climbs 2 flights of stairs regularly without any chest pain or shortness of breath.  No bleeding or clotting disorders, no complications with anesthesia in the past.\par No other active issues.

## 2019-08-22 NOTE — PHYSICAL EXAM
[No Acute Distress] : no acute distress [Well Nourished] : well nourished [Well Developed] : well developed [Well-Appearing] : well-appearing [Normal Sclera/Conjunctiva] : normal sclera/conjunctiva [Normal TMs] : both tympanic membranes were normal [No Lymphadenopathy] : no lymphadenopathy [Supple] : supple [Thyroid Normal, No Nodules] : the thyroid was normal and there were no nodules present [No Respiratory Distress] : no respiratory distress  [No Accessory Muscle Use] : no accessory muscle use [Clear to Auscultation] : lungs were clear to auscultation bilaterally [Normal Rate] : normal rate  [Regular Rhythm] : with a regular rhythm [Normal S1, S2] : normal S1 and S2 [No Murmur] : no murmur heard [No Carotid Bruits] : no carotid bruits [Pedal Pulses Present] : the pedal pulses are present [No Edema] : there was no peripheral edema [Soft] : abdomen soft [Non Tender] : non-tender [Non-distended] : non-distended [No Masses] : no abdominal mass palpated [No HSM] : no HSM [Normal Bowel Sounds] : normal bowel sounds [Normal Supraclavicular Nodes] : no supraclavicular lymphadenopathy [Normal Posterior Cervical Nodes] : no posterior cervical lymphadenopathy [Normal Anterior Cervical Nodes] : no anterior cervical lymphadenopathy [No Joint Swelling] : no joint swelling [Normal Gait] : normal gait [Normal Affect] : the affect was normal [de-identified] : no diffuse erythema or exudates posterior oropharynx [de-identified] : psoriatic plaques on hands

## 2019-09-07 LAB
CULTURE RESULTS: SIGNIFICANT CHANGE UP
CULTURE RESULTS: SIGNIFICANT CHANGE UP
SPECIMEN SOURCE: SIGNIFICANT CHANGE UP
SPECIMEN SOURCE: SIGNIFICANT CHANGE UP

## 2019-09-12 ENCOUNTER — APPOINTMENT (OUTPATIENT)
Dept: ENDOCRINOLOGY | Facility: CLINIC | Age: 54
End: 2019-09-12
Payer: COMMERCIAL

## 2019-09-12 VITALS
WEIGHT: 230 LBS | OXYGEN SATURATION: 96 % | SYSTOLIC BLOOD PRESSURE: 155 MMHG | DIASTOLIC BLOOD PRESSURE: 76 MMHG | BODY MASS INDEX: 32.93 KG/M2 | HEIGHT: 70 IN | HEART RATE: 87 BPM

## 2019-09-12 PROCEDURE — 99245 OFF/OP CONSLTJ NEW/EST HI 55: CPT

## 2019-09-24 ENCOUNTER — MESSAGE (OUTPATIENT)
Age: 54
End: 2019-09-24

## 2019-09-26 ENCOUNTER — TRANSCRIPTION ENCOUNTER (OUTPATIENT)
Age: 54
End: 2019-09-26

## 2019-09-28 LAB
CULTURE RESULTS: SIGNIFICANT CHANGE UP
SPECIMEN SOURCE: SIGNIFICANT CHANGE UP

## 2019-10-23 LAB
CULTURE RESULTS: SIGNIFICANT CHANGE UP
SPECIMEN SOURCE: SIGNIFICANT CHANGE UP

## 2019-10-29 ENCOUNTER — APPOINTMENT (OUTPATIENT)
Dept: ENDOCRINOLOGY | Facility: CLINIC | Age: 54
End: 2019-10-29

## 2019-11-05 ENCOUNTER — RX RENEWAL (OUTPATIENT)
Age: 54
End: 2019-11-05

## 2020-07-01 ENCOUNTER — APPOINTMENT (OUTPATIENT)
Dept: INTERNAL MEDICINE | Facility: CLINIC | Age: 55
End: 2020-07-01
Payer: COMMERCIAL

## 2020-07-01 VITALS
BODY MASS INDEX: 33.21 KG/M2 | SYSTOLIC BLOOD PRESSURE: 140 MMHG | HEIGHT: 70 IN | WEIGHT: 232 LBS | DIASTOLIC BLOOD PRESSURE: 80 MMHG | OXYGEN SATURATION: 98 % | HEART RATE: 96 BPM

## 2020-07-01 VITALS — SYSTOLIC BLOOD PRESSURE: 136 MMHG | DIASTOLIC BLOOD PRESSURE: 80 MMHG

## 2020-07-01 DIAGNOSIS — R19.4 CHANGE IN BOWEL HABIT: ICD-10-CM

## 2020-07-01 DIAGNOSIS — Z86.19 PERSONAL HISTORY OF OTHER INFECTIOUS AND PARASITIC DISEASES: ICD-10-CM

## 2020-07-01 DIAGNOSIS — R31.29 OTHER MICROSCOPIC HEMATURIA: ICD-10-CM

## 2020-07-01 DIAGNOSIS — Z87.19 PERSONAL HISTORY OF OTHER DISEASES OF THE DIGESTIVE SYSTEM: ICD-10-CM

## 2020-07-01 DIAGNOSIS — Z09 ENCOUNTER FOR FOLLOW-UP EXAMINATION AFTER COMPLETED TREATMENT FOR CONDITIONS OTHER THAN MALIGNANT NEOPLASM: ICD-10-CM

## 2020-07-01 DIAGNOSIS — R14.0 ABDOMINAL DISTENSION (GASEOUS): ICD-10-CM

## 2020-07-01 PROCEDURE — 99396 PREV VISIT EST AGE 40-64: CPT

## 2020-07-18 PROBLEM — Z09 POSTOP CHECK: Status: RESOLVED | Noted: 2019-08-14 | Resolved: 2020-07-18

## 2020-07-18 PROBLEM — R19.4 RECENT CHANGE IN FREQUENCY OF BOWEL MOVEMENTS: Status: RESOLVED | Noted: 2018-12-17 | Resolved: 2020-07-18

## 2020-07-18 PROBLEM — Z87.19 HISTORY OF BLOODY STOOLS: Status: RESOLVED | Noted: 2019-04-03 | Resolved: 2020-07-18

## 2020-07-18 PROBLEM — R14.0 ABDOMINAL BLOATING: Status: RESOLVED | Noted: 2017-05-18 | Resolved: 2020-07-18

## 2020-07-18 RX ORDER — ASCORBIC ACID 500 MG
TABLET ORAL
Refills: 0 | Status: ACTIVE | COMMUNITY

## 2020-07-18 NOTE — HISTORY OF PRESENT ILLNESS
[de-identified] : 53 yo man with h/o as below here for CPE.\par Still on medication for ?cryptococcus, supposed to be on medication for 6-12 months, following with ID.  \par Will f/up with endocrine for adrenal nodule, was supposed to get 24 hour urine catecholamines.  \par Feeling well overall.  Not sure if has trigger finger, finger will lock and can't squeeze fingers.\par Wrists have a "heavy ache", not true pain, plays guitar, not sure if contributing, has to keep wrist flat when sleeps, will wear splints for carpal tunnel.\par Has insomnia.\par Also has right knee pain.\par No other active issues. [FreeTextEntry1] : physical

## 2020-07-18 NOTE — PHYSICAL EXAM
[Well Developed] : well developed [No Acute Distress] : no acute distress [Well Nourished] : well nourished [Normal Oropharynx] : the oropharynx was normal [Well-Appearing] : well-appearing [PERRL] : pupils equal round and reactive to light [Normal TMs] : both tympanic membranes were normal [No Lymphadenopathy] : no lymphadenopathy [Thyroid Normal, No Nodules] : the thyroid was normal and there were no nodules present [Supple] : supple [No Accessory Muscle Use] : no accessory muscle use [Clear to Auscultation] : lungs were clear to auscultation bilaterally [No Respiratory Distress] : no respiratory distress  [Normal Rate] : normal rate  [Normal S1, S2] : normal S1 and S2 [Regular Rhythm] : with a regular rhythm [No Carotid Bruits] : no carotid bruits [No Murmur] : no murmur heard [Pedal Pulses Present] : the pedal pulses are present [Soft] : abdomen soft [No Edema] : there was no peripheral edema [Non Tender] : non-tender [Normal Appearance] : normal in appearance [No HSM] : no HSM [No Masses] : no abdominal mass palpated [Non-distended] : non-distended [Normal Supraclavicular Nodes] : no supraclavicular lymphadenopathy [Normal Bowel Sounds] : normal bowel sounds [Normal Posterior Cervical Nodes] : no posterior cervical lymphadenopathy [Normal Anterior Cervical Nodes] : no anterior cervical lymphadenopathy [No Joint Swelling] : no joint swelling [Normal Gait] : normal gait [Normal Affect] : the affect was normal [de-identified] : deferred as no chaperone available [de-identified] : scattered moles

## 2020-07-18 NOTE — REVIEW OF SYSTEMS
[Constipation] : constipation [Swollen Glands] : swollen glands [Negative] : ENT [Fever] : no fever [Chills] : no chills [Fatigue] : no fatigue [Recent Change In Weight] : ~T no recent weight change [Chest Pain] : no chest pain [Palpitations] : no palpitations [Shortness Of Breath] : no shortness of breath [Abdominal Pain] : no abdominal pain [Cough] : no cough [Nausea] : no nausea [Heartburn] : no heartburn [Vomiting] : no vomiting [Diarrhea] : no diarrhea [Hesitancy] : no hesitancy [Melena] : no melena [Dysuria] : no dysuria [Nocturia] : no nocturia [Skin Rash] : no skin rash [Dizziness] : no dizziness [Impotence] : no impotency [Fainting] : no fainting [Depression] : no depression [Anxiety] : no anxiety [FreeTextEntry4] : swollen gland last 2 days left side of neck [Easy Bleeding] : no easy bleeding [Easy Bruising] : no easy bruising [FreeTextEntry9] : see hpi [FreeTextEntry8] : no incomplete voiding [de-identified] : had precancerous spot removed from arm [FreeTextEntry7] : irregular bowel movements, constipated when usually not

## 2020-07-18 NOTE — ASSESSMENT
[FreeTextEntry1] : 55 yo man with h/o as above including HTN, preDM, lung nodule s/p RVATS and RLL wedge resection and found to have cryptococcus now on treatment, adrenal nodule, intracranial hemangioma, asthma, hypertriglyceridemia here for CPE.\par 1.  CV - bp borderline, to work on diet/exercise/weight loss and would recheck bp in 6 months; recent lipids with LDL 90 at goal\par 2.  Endo - following with endocrine, will repeat 24 hr urine catecholamines as needed for endo, TSH  for constipation; hgbA1c 6.3 c/w preDM, to work on diet, vitamin D 21 c/w insufficiency, to take vitamin D supplement\par 3.  Derm - consider derm eval skin check\par 4.  Neuro - hemangioma being monitored\par 5.  MSK - pt to consider hand ortho if finger/wrist issues worsen\par 6.   - repeat UA negative for microscopic hematuria\par 7.  GI - colonoscopy utd\par 8.  HCM - other labs reviewed with pt, nl cbc, nl cmp except slightly elevated ALT from fatty liver; consider TDap, shingrix as due\par 9.  RTO 6 months bp check\par \par \par

## 2020-07-18 NOTE — HEALTH RISK ASSESSMENT
[0] : 2) Feeling down, depressed, or hopeless: Not at all (0) [Patient reported colonoscopy was normal] : Patient reported colonoscopy was normal [ColonoscopyDate] : 01/16

## 2020-10-26 ENCOUNTER — APPOINTMENT (OUTPATIENT)
Dept: INTERNAL MEDICINE | Facility: CLINIC | Age: 55
End: 2020-10-26

## 2020-11-11 ENCOUNTER — RX RENEWAL (OUTPATIENT)
Age: 55
End: 2020-11-11

## 2021-01-25 ENCOUNTER — APPOINTMENT (OUTPATIENT)
Dept: INTERNAL MEDICINE | Facility: CLINIC | Age: 56
End: 2021-01-25
Payer: COMMERCIAL

## 2021-01-25 ENCOUNTER — NON-APPOINTMENT (OUTPATIENT)
Age: 56
End: 2021-01-25

## 2021-01-25 VITALS
WEIGHT: 242 LBS | HEART RATE: 86 BPM | SYSTOLIC BLOOD PRESSURE: 150 MMHG | OXYGEN SATURATION: 99 % | BODY MASS INDEX: 34.65 KG/M2 | DIASTOLIC BLOOD PRESSURE: 90 MMHG | HEIGHT: 70 IN

## 2021-01-25 PROCEDURE — 99072 ADDL SUPL MATRL&STAF TM PHE: CPT

## 2021-01-25 PROCEDURE — 93000 ELECTROCARDIOGRAM COMPLETE: CPT

## 2021-01-25 PROCEDURE — 99214 OFFICE O/P EST MOD 30 MIN: CPT | Mod: 25

## 2021-01-30 NOTE — ASSESSMENT
[FreeTextEntry1] : 1) HTN - has been on low dose amlodipine for 3 - 4 years.  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  ECG done to look for LVH - not present.  For now will double amlodipine to 5 mg from 2.5.  We will RN at 5 mg, and titrate based on readings here.  Also reminded many pts need two meds together.  2) has issue of adrenal nodule that has been hormonally worked up given HTN.  That work up was negative - don't think anything about this bp increase is c/w hormonal syndrome, so will hold off re work up for now.  3) bp check 6-8 weeks.

## 2021-01-30 NOTE — HISTORY OF PRESENT ILLNESS
[FreeTextEntry8] : See nurse's note - patient called because he was seeing higher bp readings on a home monitor.  He was getting systolic readings 160-180 range.  With this has had moments of some lightheadedness, some mild headaches on review.  Has no cp, sob, jeffers, orthopnea, edema, n/v.  Feels fine now.

## 2021-01-30 NOTE — PHYSICAL EXAM
[No Acute Distress] : no acute distress [Well Nourished] : well nourished [Well Developed] : well developed [Well-Appearing] : well-appearing [Supple] : supple [No JVD] : no jugular venous distention [No Respiratory Distress] : no respiratory distress  [No Accessory Muscle Use] : no accessory muscle use [Clear to Auscultation] : lungs were clear to auscultation bilaterally [Normal Percussion] : the chest was normal to percussion [Normal Rate] : normal rate  [Regular Rhythm] : with a regular rhythm [Normal S1, S2] : normal S1 and S2 [No Murmur] : no murmur heard [No Carotid Bruits] : no carotid bruits [No Edema] : there was no peripheral edema [No Extremity Clubbing/Cyanosis] : no extremity clubbing/cyanosis

## 2021-02-11 ENCOUNTER — NON-APPOINTMENT (OUTPATIENT)
Age: 56
End: 2021-02-11

## 2021-03-15 ENCOUNTER — APPOINTMENT (OUTPATIENT)
Dept: INTERNAL MEDICINE | Facility: CLINIC | Age: 56
End: 2021-03-15

## 2021-03-16 ENCOUNTER — APPOINTMENT (OUTPATIENT)
Dept: INTERNAL MEDICINE | Facility: CLINIC | Age: 56
End: 2021-03-16
Payer: COMMERCIAL

## 2021-03-16 VITALS
HEIGHT: 70 IN | DIASTOLIC BLOOD PRESSURE: 90 MMHG | HEART RATE: 90 BPM | SYSTOLIC BLOOD PRESSURE: 150 MMHG | OXYGEN SATURATION: 99 % | WEIGHT: 244 LBS | BODY MASS INDEX: 34.93 KG/M2

## 2021-03-16 PROCEDURE — 99214 OFFICE O/P EST MOD 30 MIN: CPT

## 2021-03-16 PROCEDURE — 99072 ADDL SUPL MATRL&STAF TM PHE: CPT

## 2021-03-24 ENCOUNTER — APPOINTMENT (OUTPATIENT)
Age: 56
End: 2021-03-24
Payer: COMMERCIAL

## 2021-03-24 PROCEDURE — 0002A: CPT

## 2021-03-25 NOTE — HISTORY OF PRESENT ILLNESS
[FreeTextEntry1] : r leg/buttocks pain [de-identified] : Pt to office today with c/o r leg pain beginning and upper r buttocks area, traveling to some degree down posterolateral aspect r le.  This has been going on for better part of last week and a half without a great deal of improvement. When wakes up and gets out of bed, pain is present.  He has used advil, motrin, tylenol, some topicals without significant benefit.  Has not fallen and  has no major issue with imbalance.  Comorbidities include psoriasis though this has been contained.

## 2021-03-25 NOTE — PHYSICAL EXAM
[No Edema] : there was no peripheral edema [No CVA Tenderness] : no CVA  tenderness [No Spinal Tenderness] : no spinal tenderness [Normal] : affect was normal and insight and judgment were intact [de-identified] : no tenderness over spinousp rocesses, + notable excess tension b paraspinal mm. lower lumbar [de-identified] : some limitation to rom b hips; no substantial difference r/l [de-identified] : r great toe (though also s/p surgery with long-healed incision) with some reduced power to extension v resistance

## 2021-03-25 NOTE — PLAN
[FreeTextEntry1] : Pt with very likely lumbar radiculopathy - L4-L5 +/- L5-S1. Will use course of anti-inflammatories - otc's have not been sufficient to remedy sx.  will prescribe nabumetone to use bid x next 7 days with food/fluids.  will avoid systemic steroids given psoriasis.  mm. relaxant may be of help as well for HS.  Discussed precautions including grogginess - to avoid driving/heavy work while using.  Discussed lifting precautions, positioning.  To begin some easy stretching, also use local heat.

## 2021-05-03 ENCOUNTER — APPOINTMENT (OUTPATIENT)
Dept: INTERNAL MEDICINE | Facility: CLINIC | Age: 56
End: 2021-05-03
Payer: COMMERCIAL

## 2021-05-03 VITALS
SYSTOLIC BLOOD PRESSURE: 154 MMHG | BODY MASS INDEX: 34.93 KG/M2 | WEIGHT: 244 LBS | HEIGHT: 70 IN | DIASTOLIC BLOOD PRESSURE: 80 MMHG | HEART RATE: 79 BPM | OXYGEN SATURATION: 98 %

## 2021-05-03 PROCEDURE — 99213 OFFICE O/P EST LOW 20 MIN: CPT

## 2021-05-03 PROCEDURE — 99072 ADDL SUPL MATRL&STAF TM PHE: CPT

## 2021-05-06 NOTE — HISTORY OF PRESENT ILLNESS
[FreeTextEntry8] : Came in for BP management.\par Called to be seen as /90 at home on Amlodipine 5mg OD\par In office today 150/80, repeat 134/84-90\par Working from home since March 2020 and less active \par Work out on equipment for over an hour 3 times /week\par Drinks water, ice tea -with splenda\par BMI 35, increase from  BMI 33 in July 2020\par Denies chest pain, SOB, dizziness, and no swelling of feet\par

## 2021-06-14 ENCOUNTER — APPOINTMENT (OUTPATIENT)
Dept: BARIATRICS/WEIGHT MGMT | Facility: CLINIC | Age: 56
End: 2021-06-14

## 2021-06-21 ENCOUNTER — APPOINTMENT (OUTPATIENT)
Dept: BARIATRICS/WEIGHT MGMT | Facility: CLINIC | Age: 56
End: 2021-06-21
Payer: COMMERCIAL

## 2021-06-21 VITALS — BODY MASS INDEX: 33.5 KG/M2 | HEIGHT: 70 IN | WEIGHT: 234 LBS

## 2021-06-21 DIAGNOSIS — E66.9 OBESITY, UNSPECIFIED: ICD-10-CM

## 2021-06-21 DIAGNOSIS — E66.3 OVERWEIGHT: ICD-10-CM

## 2021-06-21 DIAGNOSIS — R03.0 ELEVATED BLOOD-PRESSURE READING, W/OUT DIAGNOSIS OF HYPERTENSION: ICD-10-CM

## 2021-06-21 PROCEDURE — 99205 OFFICE O/P NEW HI 60 MIN: CPT | Mod: 95

## 2021-06-21 RX ORDER — NABUMETONE 500 MG/1
500 TABLET, FILM COATED ORAL
Qty: 30 | Refills: 0 | Status: DISCONTINUED | COMMUNITY
Start: 2021-03-16 | End: 2021-06-21

## 2021-06-21 RX ORDER — FLUCONAZOLE 150 MG/1
TABLET ORAL DAILY
Refills: 0 | Status: DISCONTINUED | COMMUNITY
End: 2021-06-21

## 2021-06-21 NOTE — HISTORY OF PRESENT ILLNESS
[FreeTextEntry1] : Mr. ROBBY HUBER is a 55 year year old male who presents for evaluation and treatment of Class 2 obesity. \par \par Obesity related co-morbidities: psoriasis - doesn't use creams/very mild, prediabetes, Hypertriglyceridemia, HTN on amlodipine bp 150/90, asthma, adrenal nodule, fatty liver, arthritis.  Has a blood pressure cuff at home.  In 2010 - tibial fracture s/p surgery now walks and runs.  \par BG - 120s morning fasting. 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 \par \par Weight History:\par Lowest adult weight: 175 \par Highest adult weight: 235 (now)\par \par Goal - of being 200# where he felt comfortable benson knees.  In 2010, lost a lot of weight 175# and didn't feel comfortable.  His knees would feel better. \par \par Has not gained any pounds over the past year.  Lost 5-8 lbs in the last month. Pre- covid was walking 20- 25k steps in the city. \par Obesity began in gradually over adult years.  Weight gain has occurred with: late night eating.\par Adult life fluctuated between 200 and 245. Does best with printed guidelines.  Loses 10- 15 lbs in 2-3 weeks, and then after that "gets difficult."  He has found that when he's not so busy with work, he's able to exercise more and concentrate more on food intake. When he's busy at work, he's skipping breakfast and lunch.  \par \par Past weight loss attempts include:  WW - 2000. Eating small portions q 3-4 hours, intermittent fasting, balanced habits.  These have produced a maximum of 30 pounds of weight loss.  \par Anti-obesity medications in the past: no.  \par  \par Reasons for desiring weight loss: health\par Perceived obstacles to losing weight: portion control\par \par Sleep: 4-5 hours. Restless sleeper.  They call me "the shark."  "always thinking."  Get up several times during the night.  On vacation last week he was sleeping more. Feels well rested, gets more tired at the end of the day.  \par \par Has 1 regular meals a day - dinner, heaviest.\par \par low salt diet. \par \par Diet history: \par wakes up at: \par B: 8-9am 2-3 eggs and wheat toast - 3/7 days. \par L: 3/7 days - 1-2pm.  bowl of soup, light sandwich like chicken salad. \par D: He cooks all his meals.  7-8pm - "pastured meats, produce from [employer], buy local".  tonight - brisket. pork chops, chicken, seafood, beef.  Salad with all meals.  Occasionally - corn, rice, deshawn wheat.  Gf is a great cook. \par \par snacks: as late as 10pm late night - ice cream, apples, hummus with veggie sticks.  3-4 nights a week. \par eating after dinner: yes\par overeating episodes: used to eat like this, but last 6-8 months has not done.  \par \par Sodas/fast food/processed foods: weekly - chinese, Mongolian, American\par \par Water intake per day: 8+ cups\par \par Physical activity:\par Patient enjoys:  bicycle riding, stretch program on [place], work out in the gym for 20- 30 min of strength\par Current physical activity: walking/bike riding 30 min daily\par Equipment at home - treadmill, runner, stretching machine, dumbbells\par \par Habits patient would like to change: changing schedule of meals\par Level of interest in losing weight: 5/5\par Community support: 5/5\par \par Factors that have helped in the past with losing weight and keeping it off: none\par \par

## 2021-06-21 NOTE — REVIEW OF SYSTEMS
[Patient Intake Form Reviewed] : Patient intake form was reviewed [Negative] : Allergic/Immunologic [MED-ROS-[facility]iovas-FT] : palpitations [MED-ROS-Cons-FT] : weight gain, fatigue [[facility]] : numbness/tingling, stiffness, dec ROM [MED-ROS-Integum-FT] : psoriasis

## 2021-06-21 NOTE — ASSESSMENT
[FreeTextEntry1] : 55 y.o male with Class 2 obesity now Class 1 obesity, HyperTG, prediabetes 6.3%, HTN not controlled, fatty liver, presents for weight management. \par \par - sent handouts - whole recipe, plate, breakfast, schedule\par - need to focus on timing of meals\par - breakfast consistency\par - will monitor bp, fasting BG, and sleep quality\par - continue gym 5 times a week, mod-vig activity\par - continue water intake at least 64 oz per day\par - defer medications at this time\par \par Extensive dietary counseling was provided:\par -discussed calorie reduction options: plant-based whole food diet vs. Dash / Mediterranean w/ calorie reduction\par -discussed the usefulness of calorie counting phone applications\par -provided patient with daily estimated calorie requirements and recommended calorie reduction amount\par -three meal components emphasized: large portion vegetables/fruit, smaller portion protein favoring plant-based, smaller portion high fiber carbohydrates\par -properties of macronutrients were reviewed to help the patient understand why a balanced diet is important\par -discussed the avoidance of red and processed meat, sugar sweetened beverages, refined carbohydrates, high fat dairy\par -advised avoidance of snack products and packaged / processed foods\par -counseled about meal timing and portion: large breakfast, medium lunch, small dinner\par -advised to avoid carbohydrates in evening if possible\par -regular water or seltzer throughout the day\par -for stimulus control, advised to keep unhealthy foods out of the house or out of view\par -recommended abstaining entirely from restaurant / fast food / take-out meals\par \par Lifestyle recommendations for weight loss were extensively reviewed\par -aerobic exercise reviewed: moderate intensity versus high intensity exercise - an estimate of daily / weekly time requirements was reviewed\par -emphasized that resistance training in addition to aerobic may provide added benefit\par -emphasized that long term weight loss and maintenance depend upon exercise\par -the need for adequate sleep (6+ hours) was reviewed\par \par f/u 2-3 weeks TEB\par \par Bariatric surgery history: no\par Obesity co-morbidities: prediabetes, hypercholesterol, fatty liver, HTN\par Comorbidities improved or resolved:na\par Anti-obesity medications:none\par Obesity medication side effects:na\par

## 2021-07-08 ENCOUNTER — APPOINTMENT (OUTPATIENT)
Dept: BARIATRICS/WEIGHT MGMT | Facility: CLINIC | Age: 56
End: 2021-07-08
Payer: COMMERCIAL

## 2021-07-08 VITALS — BODY MASS INDEX: 33.58 KG/M2 | WEIGHT: 234 LBS

## 2021-07-08 PROCEDURE — 99214 OFFICE O/P EST MOD 30 MIN: CPT | Mod: 95

## 2021-07-08 NOTE — ASSESSMENT
[FreeTextEntry1] : 55 y.o male with Class 2 obesity now Class 1 obesity, HyperTG, prediabetes 6.3%, HTN not controlled, fatty liver, presents for weight management. \par \par - start breakfast more consistently - even having an apple/other fruit with handful of nuts\par - continue earlier dinner/no nighttime snacks\par - Continue to monitor bp, fasting BG, and sleep quality\par - Restart gym 5 times a week, mod-vig activity\par - continue water intake at least 64 oz per day\par - defer medications at this time\par \par f/u 4 weeks TEB\par \par Bariatric surgery history: no\par Obesity co-morbidities: prediabetes, hypercholesterol, fatty liver, HTN\par Comorbidities improved or resolved:na\par Anti-obesity medications:none\par Obesity medication side effects:na\par

## 2021-07-08 NOTE — REVIEW OF SYSTEMS
[Negative] : Constitutional [MED-ROS-Cardi[facility]as-FT] : palpitations [[facility] : numbness/tingling, stiffness, dec ROM [MED-ROS-Integum-FT] : psoriasis

## 2021-07-08 NOTE — HISTORY OF PRESENT ILLNESS
[FreeTextEntry1] : Mr. ROBBY HUBER is a 55 year year old male who presents for evaluation and treatment of Class 2 obesity, now Class 1 obesity.\par \par Obesity related co-morbidities: psoriasis - doesn't use creams/very mild, prediabetes, Hypertriglyceridemia, HTN on amlodipine bp 150/90, asthma, adrenal nodule, fatty liver, arthritis.  Has a blood pressure cuff at home.  In 2010 - tibial fracture s/p surgery now walks and runs.  \par BG - 120s morning fasting. \par \par Interim\par - cognizant of what he's eating, better at portion control\par - as he's getting older, getting full quicker.  "Love to eat."  And listening to his body more. \par - "Feels better" - eliminated night time eating\par - hasn't been to the gym due to traveling, will restart tomorrow\par - dinner "not past 7" and breakfast - 9 am \par - checked /80, BGs - 110 and 125. \par - breakfast - has about 2-3 times a week.  Has been getting busier with work.  Get up at 730, shower, get dressed and get on the road. \par \par Sleep: 4-5 hours. Restless sleeper.  They call me "the shark."  "always thinking."  Get up several times during the night.  On vacation last week he was sleeping more. Feels well rested, gets more tired at the end of the day.  \par \par Has 1-2 regular meals a day - dinner, heaviest.  Has either breakfast or lunch daily\par \par low salt diet. \par \par Diet history: \par wakes up at: \par B: 8-9am 2-3 eggs and wheat toast - 3/7 days. Busy with work/getting out the door makes him skip half the time\par L: 3/7 days - 1-2pm.  bowl of soup, light sandwich like chicken salad. \par D: He cooks all his meals.  7-8pm - "pastured meats, produce from farmers market, buy local".  tonight - brisket. pork chops, chicken, seafood, beef.  Salad with all meals.  Occasionally - corn, rice, deshawn wheat.  Gf is a great cook. \par \par snacks: as late as 10pm late night - ice cream, apples, hummus with veggie sticks.  3-4 nights a week. \par eating after dinner: yes\par overeating episodes: used to eat like this, but last 6-8 months has not done.  \par \par Sodas/fast food/processed foods: weekly - chinese, Estonian, American\par \par Water intake per day: 8+ cups\par \par Physical activity:\par Patient enjoys:  bicycle riding, stretch program on [place], work out in the gym for 20- 30 min of strength\par Current physical activity: going back to walking/bike riding 30 min daily\par Equipment at home - treadmill, runner, stretching machine, dumbbells\par \par Habits patient would like to change: changing schedule of meals\par Level of interest in losing weight: 5/5\par Community support: 5/5\par \par Factors that have helped in the past with losing weight and keeping it off: none\par \par Weight History:\par Lowest adult weight: 175 \par Highest adult weight: 235 (now)\par \par Goal - of being 200# where he felt comfortable benson knees.  In 2010, lost a lot of weight 175# and didn't feel comfortable.  His knees would feel better. \par \par Has not gained any pounds over the past year.  Lost 5-8 lbs in the last month. Pre- covid was walking 20- 25k steps in the city. \par Obesity began in gradually over adult years.  Weight gain has occurred with: late night eating.\par Adult life fluctuated between 200 and 245. Does best with printed guidelines.  Loses 10- 15 lbs in 2-3 weeks, and then after that "gets difficult."  He has found that when he's not so busy with work, he's able to exercise more and concentrate more on food intake. When he's busy at work, he's skipping breakfast and lunch.  \par \par Past weight loss attempts include:  WW - 2000. Eating small portions q 3-4 hours, intermittent fasting, balanced habits.  These have produced a maximum of 30 pounds of weight loss.  \par Anti-obesity medications in the past: no.  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 \par \par

## 2021-11-15 ENCOUNTER — RX RENEWAL (OUTPATIENT)
Age: 56
End: 2021-11-15

## 2021-12-19 ENCOUNTER — RX RENEWAL (OUTPATIENT)
Age: 56
End: 2021-12-19

## 2022-01-12 ENCOUNTER — APPOINTMENT (OUTPATIENT)
Dept: INTERNAL MEDICINE | Facility: CLINIC | Age: 57
End: 2022-01-12
Payer: COMMERCIAL

## 2022-01-12 ENCOUNTER — NON-APPOINTMENT (OUTPATIENT)
Age: 57
End: 2022-01-12

## 2022-01-12 VITALS
RESPIRATION RATE: 19 BRPM | BODY MASS INDEX: 34.07 KG/M2 | OXYGEN SATURATION: 98 % | HEART RATE: 82 BPM | SYSTOLIC BLOOD PRESSURE: 160 MMHG | WEIGHT: 238 LBS | DIASTOLIC BLOOD PRESSURE: 90 MMHG | HEIGHT: 70 IN

## 2022-01-12 DIAGNOSIS — Z13.31 ENCOUNTER FOR SCREENING FOR DEPRESSION: ICD-10-CM

## 2022-01-12 DIAGNOSIS — Z13.39 ENCOUNTER FOR SCREENING EXAM FOR OTHER MENTAL HEALTH AND BEHAVIORAL DISORDERS: ICD-10-CM

## 2022-01-12 DIAGNOSIS — L40.9 PSORIASIS, UNSPECIFIED: ICD-10-CM

## 2022-01-12 PROCEDURE — G0442 ANNUAL ALCOHOL SCREEN 15 MIN: CPT

## 2022-01-12 PROCEDURE — 93000 ELECTROCARDIOGRAM COMPLETE: CPT | Mod: 59

## 2022-01-12 PROCEDURE — G0444 DEPRESSION SCREEN ANNUAL: CPT | Mod: 59

## 2022-01-12 PROCEDURE — 99396 PREV VISIT EST AGE 40-64: CPT | Mod: 25

## 2022-01-13 PROBLEM — Z13.39 SCREENING FOR ALCOHOLISM: Status: ACTIVE | Noted: 2022-01-13

## 2022-01-13 LAB
ALBUMIN SERPL ELPH-MCNC: 4.8 G/DL
ALP BLD-CCNC: 87 U/L
ALT SERPL-CCNC: 108 U/L
ANION GAP SERPL CALC-SCNC: 13 MMOL/L
APPEARANCE: CLEAR
AST SERPL-CCNC: 71 U/L
BACTERIA: NEGATIVE
BASOPHILS # BLD AUTO: 0.07 K/UL
BASOPHILS NFR BLD AUTO: 0.7 %
BILIRUB SERPL-MCNC: 0.6 MG/DL
BILIRUBIN URINE: NEGATIVE
BLOOD URINE: NEGATIVE
BUN SERPL-MCNC: 14 MG/DL
CALCIUM SERPL-MCNC: 9.5 MG/DL
CHLORIDE SERPL-SCNC: 102 MMOL/L
CHOLEST SERPL-MCNC: 150 MG/DL
CO2 SERPL-SCNC: 25 MMOL/L
COLOR: YELLOW
CREAT SERPL-MCNC: 0.96 MG/DL
EOSINOPHIL # BLD AUTO: 0.18 K/UL
EOSINOPHIL NFR BLD AUTO: 1.9 %
ESTIMATED AVERAGE GLUCOSE: 177 MG/DL
GLUCOSE QUALITATIVE U: NEGATIVE
GLUCOSE SERPL-MCNC: 152 MG/DL
HBA1C MFR BLD HPLC: 7.8 %
HCT VFR BLD CALC: 48.3 %
HDLC SERPL-MCNC: 47 MG/DL
HGB BLD-MCNC: 15.5 G/DL
HYALINE CASTS: 0 /LPF
IMM GRANULOCYTES NFR BLD AUTO: 0.3 %
KETONES URINE: NEGATIVE
LDLC SERPL CALC-MCNC: 72 MG/DL
LEUKOCYTE ESTERASE URINE: NEGATIVE
LYMPHOCYTES # BLD AUTO: 2.6 K/UL
LYMPHOCYTES NFR BLD AUTO: 27.3 %
MAN DIFF?: NORMAL
MCHC RBC-ENTMCNC: 27.5 PG
MCHC RBC-ENTMCNC: 32.1 GM/DL
MCV RBC AUTO: 85.8 FL
MICROSCOPIC-UA: NORMAL
MONOCYTES # BLD AUTO: 0.69 K/UL
MONOCYTES NFR BLD AUTO: 7.2 %
NEUTROPHILS # BLD AUTO: 5.96 K/UL
NEUTROPHILS NFR BLD AUTO: 62.6 %
NITRITE URINE: NEGATIVE
NONHDLC SERPL-MCNC: 103 MG/DL
PH URINE: 5.5
PLATELET # BLD AUTO: 261 K/UL
POTASSIUM SERPL-SCNC: 4.5 MMOL/L
PROT SERPL-MCNC: 7.4 G/DL
PROTEIN URINE: ABNORMAL
RBC # BLD: 5.63 M/UL
RBC # FLD: 14.8 %
RED BLOOD CELLS URINE: 0 /HPF
SODIUM SERPL-SCNC: 140 MMOL/L
SPECIFIC GRAVITY URINE: 1.03
SQUAMOUS EPITHELIAL CELLS: 1 /HPF
T4 FREE SERPL-MCNC: 1 NG/DL
TRIGL SERPL-MCNC: 155 MG/DL
TSH SERPL-ACNC: 1.17 UIU/ML
UROBILINOGEN URINE: NORMAL
WBC # FLD AUTO: 9.53 K/UL
WHITE BLOOD CELLS URINE: 1 /HPF

## 2022-01-13 NOTE — PHYSICAL EXAM
[No Acute Distress] : no acute distress [Well Nourished] : well nourished [Well Developed] : well developed [Well-Appearing] : well-appearing [Normal Sclera/Conjunctiva] : normal sclera/conjunctiva [PERRL] : pupils equal round and reactive to light [EOMI] : extraocular movements intact [Normal Outer Ear/Nose] : the outer ears and nose were normal in appearance [Normal Oropharynx] : the oropharynx was normal [No JVD] : no jugular venous distention [No Lymphadenopathy] : no lymphadenopathy [Supple] : supple [Thyroid Normal, No Nodules] : the thyroid was normal and there were no nodules present [No Respiratory Distress] : no respiratory distress  [No Accessory Muscle Use] : no accessory muscle use [Clear to Auscultation] : lungs were clear to auscultation bilaterally [Normal Rate] : normal rate  [Regular Rhythm] : with a regular rhythm [Normal S1, S2] : normal S1 and S2 [No Murmur] : no murmur heard [No Carotid Bruits] : no carotid bruits [No Abdominal Bruit] : a ~M bruit was not heard ~T in the abdomen [Pedal Pulses Present] : the pedal pulses are present [No Edema] : there was no peripheral edema [Soft] : abdomen soft [Non Tender] : non-tender [Non-distended] : non-distended [No Masses] : no abdominal mass palpated [No HSM] : no HSM [Normal Bowel Sounds] : normal bowel sounds [Normal Posterior Cervical Nodes] : no posterior cervical lymphadenopathy [Normal Anterior Cervical Nodes] : no anterior cervical lymphadenopathy [No CVA Tenderness] : no CVA  tenderness [No Spinal Tenderness] : no spinal tenderness [No Joint Swelling] : no joint swelling [Grossly Normal Strength/Tone] : grossly normal strength/tone [No Rash] : no rash [Coordination Grossly Intact] : coordination grossly intact [No Focal Deficits] : no focal deficits [Normal Gait] : normal gait [Deep Tendon Reflexes (DTR)] : deep tendon reflexes were 2+ and symmetric [Normal Affect] : the affect was normal [Normal Insight/Judgement] : insight and judgment were intact [Normal TMs] : both tympanic membranes were normal [de-identified] : b/l hands joint numbness ad burning sense. No weakness. normal ROM

## 2022-01-13 NOTE — ASSESSMENT
[FreeTextEntry1] : # CV\par c/o daily palpitation and uncontrolled HTN but reportedly took medications regularly.\par ECG today: NSR 78bpm\par BP was high as 160/90 and repeated one was 130/80 after sitting. \par Pt should cut down salty snacking.\par continue Low sodium diet, take medications every day and regularly exercise\par Continue take Amlodipine 2.5mg 3 tab po qd.\par continue home BP log and bring back BP cuff next visit. 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 \par \par # Obesity\par f/u weight management Dr. Noonan.\par Gained weight. BMI 34\par continue with the exercises and work on weight loss. \par \par # PreDM\par check HbA1C today.\par Denied polyuria or polydipsia.\par Referred to ophthalmology: no recent retinal check.\par Reminded of the need for a yearly dilated eye exam. Foot care and daily inspection of feet reiterated.\par Normal foot.\par \par #Adrenal nodule.\par  lung nodule s/p RVATS and RLL wedge resection and found to have cryptococcus\par Lost f/u with Endo. Referred back to Dr. Gomez.\par \par # MSK\par  pt to consider b/l hand ortho if finger/wrist issues worsen, burning and pinching when playing guitar, bothering functions. Psoriasis joint?\par He will go see his ortho provider soon. \par \par # Health maintenance\par -Colonoscopy: Referred to Dr. Maldonado, GI. \par -COVID vaccine: UTD\par -Flu vaccine: Offered  but pt declined at this time.\par -Tdap vaccine:  Offered  but pt declined at this time.\par -Pneumovax:  Offered but pt declined at this time.\par -Shingrix: Offered but pt declined at this time.\par \par To draw lab as planned.\par Follow up 3 months or prn.

## 2022-01-13 NOTE — HISTORY OF PRESENT ILLNESS
[FreeTextEntry1] : CPE [de-identified] : ROBBY HUBER  is a 56 year old male  with history of HTN, preDM, lung nodule s/p RVATS and RLL wedge resection and found to have cryptococcus now on treatment, adrenal nodule, intracranial hemangioma, asthma, hypertriglyceridemia presented today for comprehensive evaluation. He reported daily occasional palpitations that last for a few seconds. Baseline SOB on exertion did not get worse. Home BP was recently noted high  like 180s/90s. He did not see any medical providers since COVID pandemic. Did not catch COVID so far and multiple COVID test was negative. 2 Pfizer vaccine in March  and booster vaccine received in Oct 2021( pt did not recall exact date).\par Lung surgery was in Aug 2020. Pt is to see outside Pulmonologist, Dr. Obinna Ferro in a few weeks. completed antifungal tx for cryptococcus. Still has mild SOB on walking up stairs. \par \par working from home as  dealing with gym supply.  Lives with his son( 20yo) and . Still has issues in falling and staying in sleep. Tried CBD with some effect and relaxation tech or melatonin. No issue in URO, GI. Psoriasis: on/off in hands, elbows, ankles, knees and applying regular moisturizer.\par \par He needs referral for overdue health maintenance. \par -Endo: 2019 did not get 24hour urine test for adrenal nodule. \par -GI: his first colonoscopy was done by Dr. Patricio  6 years ago and was told to come back 5 years later. \par -Ophthal: No recent fundus check up. \par \par Exercise: mirror exercise and goes to gym regularly\par Diet: regular diet. snacking with fruit, cookies, chips. 2-3 diet. \par Smoking: marijuana occasionally, smoked cigarette at age 20 to 30s. He stopped on wedding day at age 28. Restarted and completely stopped 15 years go.\par recreational drug: no. marijuana occasionally\par drinking: once a month, 1-2 drinks.\par HCP: Brother Tal Huber.\par

## 2022-01-19 ENCOUNTER — NON-APPOINTMENT (OUTPATIENT)
Age: 57
End: 2022-01-19

## 2022-01-19 RX ORDER — AMLODIPINE BESYLATE 5 MG/1
5 TABLET ORAL DAILY
Qty: 90 | Refills: 1 | Status: DISCONTINUED | COMMUNITY
Start: 2017-12-18 | End: 2022-01-19

## 2022-02-06 ENCOUNTER — NON-APPOINTMENT (OUTPATIENT)
Age: 57
End: 2022-02-06

## 2022-02-06 ENCOUNTER — EMERGENCY (EMERGENCY)
Facility: HOSPITAL | Age: 57
LOS: 1 days | Discharge: HOME | End: 2022-02-06
Attending: EMERGENCY MEDICINE
Payer: COMMERCIAL

## 2022-02-06 VITALS
OXYGEN SATURATION: 97 % | HEART RATE: 88 BPM | SYSTOLIC BLOOD PRESSURE: 148 MMHG | DIASTOLIC BLOOD PRESSURE: 89 MMHG | RESPIRATION RATE: 16 BRPM | TEMPERATURE: 98 F

## 2022-02-06 VITALS
WEIGHT: 235.01 LBS | RESPIRATION RATE: 20 BRPM | HEIGHT: 70.5 IN | DIASTOLIC BLOOD PRESSURE: 80 MMHG | OXYGEN SATURATION: 99 % | SYSTOLIC BLOOD PRESSURE: 153 MMHG | HEART RATE: 91 BPM | TEMPERATURE: 99 F

## 2022-02-06 DIAGNOSIS — Z41.9 ENCOUNTER FOR PROCEDURE FOR PURPOSES OTHER THAN REMEDYING HEALTH STATE, UNSPECIFIED: Chronic | ICD-10-CM

## 2022-02-06 DIAGNOSIS — S46.811A STRAIN OF OTHER MUSCLES, FASCIA AND TENDONS AT SHOULDER AND UPPER ARM LEVEL, RIGHT ARM, INITIAL ENCOUNTER: ICD-10-CM

## 2022-02-06 PROCEDURE — 99284 EMERGENCY DEPT VISIT MOD MDM: CPT

## 2022-02-06 PROCEDURE — 93005 ELECTROCARDIOGRAM TRACING: CPT

## 2022-02-06 PROCEDURE — 99283 EMERGENCY DEPT VISIT LOW MDM: CPT

## 2022-02-06 RX ORDER — ACETAMINOPHEN 500 MG
975 TABLET ORAL ONCE
Refills: 0 | Status: COMPLETED | OUTPATIENT
Start: 2022-02-06 | End: 2022-02-06

## 2022-02-06 RX ORDER — LIDOCAINE 4 G/100G
1 CREAM TOPICAL ONCE
Refills: 0 | Status: COMPLETED | OUTPATIENT
Start: 2022-02-06 | End: 2022-02-06

## 2022-02-06 RX ORDER — METAXALONE 800 MG/1
800 TABLET ORAL EVERY 8 HOURS
Qty: 15 | Refills: 0 | Status: COMPLETED | COMMUNITY
Start: 2022-02-06 | End: 2022-02-11

## 2022-02-06 RX ORDER — IBUPROFEN 200 MG
600 TABLET ORAL ONCE
Refills: 0 | Status: COMPLETED | OUTPATIENT
Start: 2022-02-06 | End: 2022-02-06

## 2022-02-06 RX ADMIN — Medication 600 MILLIGRAM(S): at 20:43

## 2022-02-06 RX ADMIN — LIDOCAINE 1 PATCH: 4 CREAM TOPICAL at 20:45

## 2022-02-06 RX ADMIN — Medication 975 MILLIGRAM(S): at 20:43

## 2022-02-06 NOTE — ED PROVIDER NOTE - PATIENT PORTAL LINK FT
You can access the FollowMyHealth Patient Portal offered by [facility] by registering at the following website: [email or web address]. By joining [employer]’s FollowMyHealth portal, you will also be able to view your health information using other applications (apps) compatible with our system.

## 2022-02-06 NOTE — ED PROVIDER NOTE - NSFOLLOWUPINSTRUCTIONS_ED_ALL_ED_FT
See SPINE CLINIC (rapid referral) this week -- call tomorrow morning to discuss.    Use prescribed medications as directed for pain -- see medication warnings.    See CERVICAL RADICULOPATHY information and return instructions given to you.    Seek immediate medical care for new/worsening symptoms/concerns. See SPINE CLINIC (rapid referral) this week -- call tomorrow morning to discuss.        [phone]    Use prescribed medications as directed for pain -- see medication warnings.    See CERVICAL RADICULOPATHY information and return instructions given to you.    Seek immediate medical care for new/worsening symptoms/concerns.

## 2022-02-06 NOTE — ED PROVIDER NOTE - NS ED ROS FT
GENERAL: No fever or chills, EYES: no change in vision, HEENT: no trouble swallowing or speaking, CARDIAC: no chest pain, PULMONARY: no cough or SOB, GI: no abdominal pain, no nausea, no vomiting, no diarrhea or constipation, : No changes in urination, SKIN: no rashes, NEURO: no headache,  MSK: No joint pain     All other ROS negative unless otherwise specified in HPI.     ~Shawna Cano M.D. Resident

## 2022-02-06 NOTE — ED ADULT NURSE NOTE - ISOLATION TYPE:
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 - ok to retest. Check temp every day. Can be positive up to 14 days after exposure Can check UPT if worried but nit late pills this pill pack. If vag d/c continues can come in for apt with Dr. Grant - very recent pap normal   None

## 2022-02-06 NOTE — ED ADULT NURSE NOTE - OBJECTIVE STATEMENT
56yM, A&Ox4, ambulatory, presents to the ED c/o 24 hrs of waxing/waning stabbing electric pains in R posterior neck down to R fingers, worse w/ bending head back. Decreased strength in right upper extremity, decreased sensation to right fingers. Otherwise, neuro exam in tact. PMH sciatica, his doctor has prescribed pain regimen for this but he has not taken anything for past 6 hrs. No difficulty speaking in complete sentences, pulses x 4, moving all extremities, abdomen soft nontender nondistended, skin intact. Pt denies fevers/chills, headache, dizziness, vision changes, cp, sob, cough, abd pain, n/v/d, dysuria, hematuria, bloody stools, back pain.

## 2022-02-06 NOTE — ED PROVIDER NOTE - ATTENDING CONTRIBUTION TO CARE
------------ATTENDING NOTE------------  RHD pt c/o 24 hrs of waxing waning stabbing electric pains in R posterior neck down to R 1s/2nd digits, worse w/ bending head back, no trauma, c/w cervical radiculopathy, +Spurling, RUQ nvi w/ bcr distally, nml neuro exam, no additional complaints, pt has sciatica and understands pathology, his doctor has prescribed pain regimen for this but he has not taken anything for past 6 hrs, in depth dw pt about ddx, tx, sanders, continued close outpt fu.  - Stanley Chan MD   ---------------------------------------------- ------------ATTENDING NOTE------------  RHD pt c/o 24 hrs of waxing waning stabbing electric pains in R posterior neck down to R 1s/2nd digits, worse w/ bending head back, no trauma, c/w cervical radiculopathy, +Spurling, RUQ nvi w/ bcr distally, nml neuro exam, no additional complaints, pt has sciatica and understands pathology, his doctor has prescribed pain regimen for this but he has not taken anything for past 6 hrs, in depth dw pt about ddx, tx, sanders, continued close outpt fu.   - Stanley Chan MD   ----------------------------------------------

## 2022-02-06 NOTE — ED PROVIDER NOTE - OBJECTIVE STATEMENT
Pt is a 56yoM w/Hx of sciatica, asthma, HTN, DM, arthritis, GERD p/w R-sided neck pain. 3d ago pt woke up feeling stiff, began experiencing constant sharp "right trapezius" pain w/radiation down right arm which has persisted through today, associated with numbness in R fingertips. Trialed meloxicam, metaxolone, advil, flexeril, heating pads without relief, has not been able to sleep 2/2 pain to he came to the ED. He denies HA, visual changes, hearing changes, cough/sore throat/congestion, SOB, pain with breathing, CP, palpitations, back pain, abd pain, n/v/d/c, dysuria/freq/urg, hematuria/hematochezia/melena, focal weakness, generalized weakness, swelling, dizziness/lightheadedness, fevers/chills, no sick contacts, no recent travel.

## 2022-02-06 NOTE — ED PROVIDER NOTE - PHYSICAL EXAMINATION
Gen: AAOx3, non-toxic, WDWN male standing next to bed  Head: NCAT  HEENT: EOMI, PERRLA, oral mucosa moist, normal conjunctiva  Lung: CTAB, no respiratory distress, no wheezes/rhonchi/rales B/L, speaking in full sentences  CV: RRR, no murmurs, rubs or gallops  Abd: soft, NTND, no guarding, no CVA tenderness  MSK: +R lateral neck TTP, FROM, no visible deformities in extremities   Neuro: +decreased sensation to LT of R index and middle fingers; 5/5 strength throughout  Skin: Warm, well perfused, no rash  Psych: normal affect.   ~Shawna Cano M.D. Resident

## 2022-02-06 NOTE — ED ADULT TRIAGE NOTE - CHIEF COMPLAINT QUOTE
upper back pain beginning three days ago with radiation to right arm with numbness in right fingertips. Denies injury to area of pain.

## 2022-02-07 ENCOUNTER — APPOINTMENT (OUTPATIENT)
Dept: ORTHOPEDIC SURGERY | Facility: CLINIC | Age: 57
End: 2022-02-07
Payer: COMMERCIAL

## 2022-02-07 VITALS
SYSTOLIC BLOOD PRESSURE: 153 MMHG | HEIGHT: 70 IN | BODY MASS INDEX: 33.64 KG/M2 | WEIGHT: 235 LBS | HEART RATE: 81 BPM | DIASTOLIC BLOOD PRESSURE: 92 MMHG

## 2022-02-07 PROCEDURE — 99204 OFFICE O/P NEW MOD 45 MIN: CPT

## 2022-02-07 PROCEDURE — 72050 X-RAY EXAM NECK SPINE 4/5VWS: CPT

## 2022-02-07 NOTE — ASSESSMENT
[FreeTextEntry1] : I had a lengthy discussion with the patient and the patient's family regards to her his current treatment plan and diagnosis.  He does have signs and symptoms very concerning for cervical radiculopathy.  He has significant pain down his right arm obvious signs of weakness.  He has significant weakness in terms of his triceps.  I am concerned about an acute cervical disc herniation.  Therefore it is imperative that he get an MRI as soon as possible.  He also has had episodes in the past for over 6+ weeks of the symptoms but unfortunately this current bout of cervical radiculopathy is the worst he has ever had.  All this makes me concerned in terms of cervical spinal cord or nerve root impingement.  Therefore it is imperative that he gets an MRI soon as possible.  I have prescribed him a course of prednisone as well as muscle relaxant and Tylenol to help with his symptoms.  We will have him come back in 1 to 2 weeks.  He knows to come back sooner if he has any new or worsening symptoms

## 2022-02-07 NOTE — PHYSICAL EXAM
[de-identified] : Cervical Physical Exam\par \par Gait - Normal\par \par Station - Normal\par \par Sagittal balance - Normal \par \par Compensatory mechanism? - None\par \par Horizontal gaze - Maintained\par \par \par Reflexes\par Biceps - hr\par Triceps - Normal\par Brachioradialis - Normal\par Patellar - Normal\par Gastroc - Normal\par Clonus -No\par \par Lyles´s - None\par \par Shoulder Exam - Normal\par \par Spurling´s -positive\par \par Wrist Pulses -2+ radial/ulnar\par \par Foot Pulses -2+ DP/PT\par \par Cervical range of motion -decreased globally\par \par Sensation \par C5-T1 sensation intact to light touch bilaterally, numbness over the index finger ring finger middle finger\par \par L1-S1 sensation intact to light touch bilaterally\par \par Motor\par \par \par 	Deltoid	Biceps	Triceps	WF	WE	IO	\par Right	4/5	4/5	3+/5	4/5	4/5	5/5	5/5\par Left	5/5	5/5	5/5	5/5	5/5	5/5	5/5\par \par \par 	IP	Quad	HS	TA	Gastroc	EHL\par Right	5/5	5/5	5/5	5/5	5/5	5/5\par Left	5/5	5/5	5/5	5/5	5/5	5/5 [de-identified] : Cervical radiographs\par Bridging anterior osteophyte noted\par No obvious instability on flexion-extension radiographs

## 2022-02-07 NOTE — HISTORY OF PRESENT ILLNESS
[de-identified] : He denies any issues with balance or hand extremity.  He denies any issues in terms of bowel or bladder function.This is a 56-year-old male that is here today for evaluation of his neck pain and right arm pain.  He describes pain which radiates into his right lateral arm into his forearm and into his middle finger ring finger and index finger.  The symptoms have been progressively getting worse over the past week.  He does describe previous episodes of the symptoms.  Unfortunately this time his symptoms have persisted and he does feel focally weak in his right arm.  He is here today to discuss neck steps and treatment.

## 2022-02-12 ENCOUNTER — OUTPATIENT (OUTPATIENT)
Dept: OUTPATIENT SERVICES | Facility: HOSPITAL | Age: 57
LOS: 1 days | End: 2022-02-12
Payer: COMMERCIAL

## 2022-02-12 ENCOUNTER — APPOINTMENT (OUTPATIENT)
Dept: MRI IMAGING | Facility: CLINIC | Age: 57
End: 2022-02-12
Payer: COMMERCIAL

## 2022-02-12 DIAGNOSIS — Z41.9 ENCOUNTER FOR PROCEDURE FOR PURPOSES OTHER THAN REMEDYING HEALTH STATE, UNSPECIFIED: Chronic | ICD-10-CM

## 2022-02-12 DIAGNOSIS — M54.2 CERVICALGIA: ICD-10-CM

## 2022-02-12 PROCEDURE — 72141 MRI NECK SPINE W/O DYE: CPT | Mod: 26

## 2022-02-12 PROCEDURE — 72141 MRI NECK SPINE W/O DYE: CPT

## 2022-02-15 ENCOUNTER — NON-APPOINTMENT (OUTPATIENT)
Age: 57
End: 2022-02-15

## 2022-02-16 ENCOUNTER — APPOINTMENT (OUTPATIENT)
Dept: ORTHOPEDIC SURGERY | Facility: CLINIC | Age: 57
End: 2022-02-16
Payer: COMMERCIAL

## 2022-02-16 VITALS — HEART RATE: 76 BPM | BODY MASS INDEX: 33.64 KG/M2 | WEIGHT: 235 LBS | HEIGHT: 70 IN | OXYGEN SATURATION: 97 %

## 2022-02-16 DIAGNOSIS — M54.2 CERVICALGIA: ICD-10-CM

## 2022-02-16 PROCEDURE — 99214 OFFICE O/P EST MOD 30 MIN: CPT

## 2022-02-16 NOTE — ASSESSMENT
[FreeTextEntry1] : I had a long discussion with the patient in regards to his treatment plan and diagnosis.  He has a small disc protrusion at C6-C7 on the right side.  This may be contributing to his current symptoms.  I would like to get more objective data in regards to why he is having such severe pain.  Therefore I would like to obtain an EMG/nerve conduction study to get a better sense of the functionality of his nerves.  This order has been placed today.  I will also start him on diclofenac to help with his symptoms.  I will have him follow-up in 1 to 2 weeks for repeat clinical evaluation.  I did encourage him to reach out to me sooner if he has any new or worsening symptoms.

## 2022-02-16 NOTE — PHYSICAL EXAM
[de-identified] : Cervical Physical Exam\par \par Gait - Normal\par \par Station - Normal\par \par Sagittal balance - Normal \par \par Compensatory mechanism? - None\par \par Horizontal gaze - Maintained\par \par \par Reflexes\par Biceps - hr\par Triceps - Normal\par Brachioradialis - Normal\par Patellar - Normal\par Gastroc - Normal\par Clonus -No\par \par Lyles´s - None\par \par Shoulder Exam - Normal\par \par Spurling´s -positive\par \par Wrist Pulses -2+ radial/ulnar\par \par Foot Pulses -2+ DP/PT\par \par Cervical range of motion -decreased globally\par \par Sensation \par C5-T1 sensation intact to light touch bilaterally, numbness over the index finger ring finger middle finger\par \par L1-S1 sensation intact to light touch bilaterally\par \par Motor\par \par \par 	Deltoid	Biceps	Triceps	WF	WE	IO	\par Right	4/5	4/5	3+/5	4/5	4/5	5/5	5/5\par Left	5/5	5/5	5/5	5/5	5/5	5/5	5/5\par \par \par 	IP	Quad	HS	TA	Gastroc	EHL\par Right	5/5	5/5	5/5	5/5	5/5	5/5\par Left	5/5	5/5	5/5	5/5	5/5	5/5 [de-identified] : Cervical radiographs\par Bridging anterior osteophyte noted\par No obvious instability on flexion-extension radiographs\par \par Cervical MRI reviewed\par No areas of critical central stenosis\par Possible foraminal stenosis noted at C6-C7 on the right side

## 2022-02-16 NOTE — HISTORY OF PRESENT ILLNESS
[de-identified] : Today the patient states that he is still doing well neck and right arm symptoms.  He denies any issues with balance or head dexterity.  He does state that he has pain which radiates into his next finger and middle finger.  It is interfering with his quality of life.  The symptoms did not significantly improve with prednisone.  He is taking tramadol which does help his symptoms to some degree.\par \par 02/07/22\par He denies any issues with balance or hand extremity.  He denies any issues in terms of bowel or bladder function.This is a 56-year-old male that is here today for evaluation of his neck pain and right arm pain.  He describes pain which radiates into his right lateral arm into his forearm and into his middle finger ring finger and index finger.  The symptoms have been progressively getting worse over the past week.  He does describe previous episodes of the symptoms.  Unfortunately this time his symptoms have persisted and he does feel focally weak in his right arm.  He is here today to discuss neck steps and treatment.

## 2022-02-17 DIAGNOSIS — M54.16 RADICULOPATHY, LUMBAR REGION: ICD-10-CM

## 2022-02-19 ENCOUNTER — EMERGENCY (EMERGENCY)
Facility: HOSPITAL | Age: 57
LOS: 1 days | Discharge: HOME | End: 2022-02-19
Attending: EMERGENCY MEDICINE
Payer: COMMERCIAL

## 2022-02-19 VITALS
TEMPERATURE: 98 F | RESPIRATION RATE: 18 BRPM | HEART RATE: 74 BPM | OXYGEN SATURATION: 100 % | SYSTOLIC BLOOD PRESSURE: 155 MMHG | DIASTOLIC BLOOD PRESSURE: 80 MMHG

## 2022-02-19 VITALS
RESPIRATION RATE: 18 BRPM | HEART RATE: 78 BPM | DIASTOLIC BLOOD PRESSURE: 79 MMHG | OXYGEN SATURATION: 99 % | TEMPERATURE: 98 F | WEIGHT: 225.09 LBS | HEIGHT: 70.5 IN | SYSTOLIC BLOOD PRESSURE: 129 MMHG

## 2022-02-19 DIAGNOSIS — Z41.9 ENCOUNTER FOR PROCEDURE FOR PURPOSES OTHER THAN REMEDYING HEALTH STATE, UNSPECIFIED: Chronic | ICD-10-CM

## 2022-02-19 LAB
ALBUMIN SERPL ELPH-MCNC: 4.1 G/DL — SIGNIFICANT CHANGE UP (ref 3.3–5)
ALP SERPL-CCNC: 90 U/L — SIGNIFICANT CHANGE UP (ref 40–120)
ALT FLD-CCNC: 89 U/L — HIGH (ref 10–45)
ANION GAP SERPL CALC-SCNC: 12 MMOL/L — SIGNIFICANT CHANGE UP (ref 5–17)
AST SERPL-CCNC: 37 U/L — SIGNIFICANT CHANGE UP (ref 10–40)
BASOPHILS # BLD AUTO: 0.07 K/UL — SIGNIFICANT CHANGE UP (ref 0–0.2)
BASOPHILS NFR BLD AUTO: 0.8 % — SIGNIFICANT CHANGE UP (ref 0–2)
BILIRUB SERPL-MCNC: 0.3 MG/DL — SIGNIFICANT CHANGE UP (ref 0.2–1.2)
BUN SERPL-MCNC: 16 MG/DL — SIGNIFICANT CHANGE UP (ref 7–23)
CALCIUM SERPL-MCNC: 9 MG/DL — SIGNIFICANT CHANGE UP (ref 8.4–10.5)
CHLORIDE SERPL-SCNC: 102 MMOL/L — SIGNIFICANT CHANGE UP (ref 96–108)
CO2 SERPL-SCNC: 24 MMOL/L — SIGNIFICANT CHANGE UP (ref 22–31)
CREAT SERPL-MCNC: 0.82 MG/DL — SIGNIFICANT CHANGE UP (ref 0.5–1.3)
EOSINOPHIL # BLD AUTO: 0.23 K/UL — SIGNIFICANT CHANGE UP (ref 0–0.5)
EOSINOPHIL NFR BLD AUTO: 2.5 % — SIGNIFICANT CHANGE UP (ref 0–6)
GLUCOSE SERPL-MCNC: 145 MG/DL — HIGH (ref 70–99)
HCT VFR BLD CALC: 41 % — SIGNIFICANT CHANGE UP (ref 39–50)
HGB BLD-MCNC: 13.4 G/DL — SIGNIFICANT CHANGE UP (ref 13–17)
IMM GRANULOCYTES NFR BLD AUTO: 0.4 % — SIGNIFICANT CHANGE UP (ref 0–1.5)
LYMPHOCYTES # BLD AUTO: 2.54 K/UL — SIGNIFICANT CHANGE UP (ref 1–3.3)
LYMPHOCYTES # BLD AUTO: 27.5 % — SIGNIFICANT CHANGE UP (ref 13–44)
MAGNESIUM SERPL-MCNC: 2.1 MG/DL — SIGNIFICANT CHANGE UP (ref 1.6–2.6)
MCHC RBC-ENTMCNC: 27.7 PG — SIGNIFICANT CHANGE UP (ref 27–34)
MCHC RBC-ENTMCNC: 32.7 GM/DL — SIGNIFICANT CHANGE UP (ref 32–36)
MCV RBC AUTO: 84.7 FL — SIGNIFICANT CHANGE UP (ref 80–100)
MONOCYTES # BLD AUTO: 0.83 K/UL — SIGNIFICANT CHANGE UP (ref 0–0.9)
MONOCYTES NFR BLD AUTO: 9 % — SIGNIFICANT CHANGE UP (ref 2–14)
NEUTROPHILS # BLD AUTO: 5.54 K/UL — SIGNIFICANT CHANGE UP (ref 1.8–7.4)
NEUTROPHILS NFR BLD AUTO: 59.8 % — SIGNIFICANT CHANGE UP (ref 43–77)
NRBC # BLD: 0 /100 WBCS — SIGNIFICANT CHANGE UP (ref 0–0)
PLATELET # BLD AUTO: 251 K/UL — SIGNIFICANT CHANGE UP (ref 150–400)
POTASSIUM SERPL-MCNC: 4.1 MMOL/L — SIGNIFICANT CHANGE UP (ref 3.5–5.3)
POTASSIUM SERPL-SCNC: 4.1 MMOL/L — SIGNIFICANT CHANGE UP (ref 3.5–5.3)
PROT SERPL-MCNC: 6.6 G/DL — SIGNIFICANT CHANGE UP (ref 6–8.3)
RBC # BLD: 4.84 M/UL — SIGNIFICANT CHANGE UP (ref 4.2–5.8)
RBC # FLD: 14.3 % — SIGNIFICANT CHANGE UP (ref 10.3–14.5)
SODIUM SERPL-SCNC: 138 MMOL/L — SIGNIFICANT CHANGE UP (ref 135–145)
WBC # BLD: 9.25 K/UL — SIGNIFICANT CHANGE UP (ref 3.8–10.5)
WBC # FLD AUTO: 9.25 K/UL — SIGNIFICANT CHANGE UP (ref 3.8–10.5)

## 2022-02-19 PROCEDURE — 96375 TX/PRO/DX INJ NEW DRUG ADDON: CPT

## 2022-02-19 PROCEDURE — 99283 EMERGENCY DEPT VISIT LOW MDM: CPT

## 2022-02-19 PROCEDURE — 80053 COMPREHEN METABOLIC PANEL: CPT

## 2022-02-19 PROCEDURE — 96374 THER/PROPH/DIAG INJ IV PUSH: CPT

## 2022-02-19 PROCEDURE — 85025 COMPLETE CBC W/AUTO DIFF WBC: CPT

## 2022-02-19 PROCEDURE — 99284 EMERGENCY DEPT VISIT MOD MDM: CPT | Mod: 25

## 2022-02-19 PROCEDURE — 83735 ASSAY OF MAGNESIUM: CPT

## 2022-02-19 PROCEDURE — 99284 EMERGENCY DEPT VISIT MOD MDM: CPT

## 2022-02-19 PROCEDURE — 96376 TX/PRO/DX INJ SAME DRUG ADON: CPT

## 2022-02-19 RX ORDER — KETOROLAC TROMETHAMINE 30 MG/ML
15 SYRINGE (ML) INJECTION ONCE
Refills: 0 | Status: DISCONTINUED | OUTPATIENT
Start: 2022-02-19 | End: 2022-02-19

## 2022-02-19 RX ORDER — LIDOCAINE 4 G/100G
1 CREAM TOPICAL ONCE
Refills: 0 | Status: COMPLETED | OUTPATIENT
Start: 2022-02-19 | End: 2022-02-19

## 2022-02-19 RX ORDER — ACETAMINOPHEN 500 MG
1000 TABLET ORAL ONCE
Refills: 0 | Status: COMPLETED | OUTPATIENT
Start: 2022-02-19 | End: 2022-02-19

## 2022-02-19 RX ORDER — SODIUM CHLORIDE 9 MG/ML
1000 INJECTION, SOLUTION INTRAVENOUS ONCE
Refills: 0 | Status: COMPLETED | OUTPATIENT
Start: 2022-02-19 | End: 2022-02-19

## 2022-02-19 RX ORDER — GABAPENTIN 400 MG/1
300 CAPSULE ORAL ONCE
Refills: 0 | Status: COMPLETED | OUTPATIENT
Start: 2022-02-19 | End: 2022-02-19

## 2022-02-19 RX ORDER — DEXAMETHASONE 0.5 MG/5ML
10 ELIXIR ORAL ONCE
Refills: 0 | Status: COMPLETED | OUTPATIENT
Start: 2022-02-19 | End: 2022-02-19

## 2022-02-19 RX ORDER — GABAPENTIN 400 MG/1
1 CAPSULE ORAL
Qty: 21 | Refills: 0
Start: 2022-02-19 | End: 2022-02-25

## 2022-02-19 RX ORDER — MAGNESIUM SULFATE 500 MG/ML
2 VIAL (ML) INJECTION ONCE
Refills: 0 | Status: COMPLETED | OUTPATIENT
Start: 2022-02-19 | End: 2022-02-19

## 2022-02-19 RX ADMIN — Medication 400 MILLIGRAM(S): at 09:40

## 2022-02-19 RX ADMIN — Medication 102 MILLIGRAM(S): at 12:06

## 2022-02-19 RX ADMIN — Medication 15 MILLIGRAM(S): at 12:35

## 2022-02-19 RX ADMIN — GABAPENTIN 300 MILLIGRAM(S): 400 CAPSULE ORAL at 09:36

## 2022-02-19 RX ADMIN — Medication 15 MILLIGRAM(S): at 09:40

## 2022-02-19 RX ADMIN — Medication 50 GRAM(S): at 09:57

## 2022-02-19 RX ADMIN — LIDOCAINE 1 PATCH: 4 CREAM TOPICAL at 12:32

## 2022-02-19 RX ADMIN — SODIUM CHLORIDE 4000 MILLILITER(S): 9 INJECTION, SOLUTION INTRAVENOUS at 09:36

## 2022-02-19 NOTE — ED PROVIDER NOTE - OBJECTIVE STATEMENT
55yo male pt with PMHx of right sciatica, asthma, HTN, DM, arthritis, GERD presents to ED with worsening right neck pain with radiating tingling pain to fingers. Pt's on f/u with spine Dr. Nolasco (2/12/22 MRI; Multilevel cervical spondylosis, with mild central stenosis at C3-4 through C5-C6 levels. Variable mild/moderate neural foraminal stenosis) and taking Diclofenac and muscle relaxant without relief. Denies fever, chills, cough or congestion. Denies headache, dizziness or worsening lower back pain. Denies CP/SOB/ABD pain or N/V/D. Denies urinary or bowel problems.

## 2022-02-19 NOTE — CONSULT NOTE ADULT - TIME BILLING
Please note that over 50 minutes of time was spent in care of this patient including   - previsit preparation  - review of imaging  - in person visit  - post visit documentation  - discussion with colleagues.

## 2022-02-19 NOTE — ED PROVIDER NOTE - PROGRESS NOTE DETAILS
Pt's evaluated by ortho and recommended; Decadron 10mg IVSS, d/c to home with Medrol Dose pack, and outpt f/u with Dr. Nolasco. No focal neuro deficit. Recommended to outpt f/u with his spine Dr. Nolasco and neurologist.

## 2022-02-19 NOTE — CONSULT NOTE ADULT - SUBJECTIVE AND OBJECTIVE BOX
Orthopedic Spine Surgery Note    56yMale who presents w/ neck pain and Right arm pain for months. Patient presents with worsening neck and arm pain that has been keeping him awake at night. He has tried diclofenac, advil, and tylenol with minimal to no relief. He says his pain is stable and complains of stable numbness in his thumb, index, and ring fingers. No pain down his left arm. Denies new numbness, tingling, and weakness. Denies bowel/bladder incontinence and saddle anesthesia. Denies fevers/chills. No other complaints at this time.    HEALTH ISSUES - PROBLEM Dx:          MEDICATIONS  (STANDING):  dexAMETHasone  IVPB 10 milliGRAM(s) IV Intermittent Once      Allergies    penicillin (Anaphylaxis; Angioedema; Pruritus)    Intolerances        Gen: NAD  Neck/Back: skin intact, no deformity  no midline TTP C/T/L/S, no palpable step offs    Neuro:    Motor:                   C5                C6              C7               C8           T1   R            4+/5                4+/5           4+/5             5/5          5/5  L             5/5               5/5             5/5             5/5          5/5                L2             L3             L4               L5            S1  R         5/5           5/5          5/5             5/5           5/5  L          5/5          5/5           5/5             5/5           5/5    Sensory:            C5         C6         C7      C8       T1        (0=absent, 1=impaired, 2=normal, NT=not testable)  R         2            1           1        2         2  L          2            2           2        2         2               L2          L3         L4      L5       S1         (0=absent, 1=impaired, 2=normal, NT=not testable)  R         2            2            2        2        2  L          2            2           2        2         2    Negative clonus  Negative babinski  Negative paz  No saddle anesthesia    Imaging:   < from: MR Cervical Spine No Cont (02.12.22 @ 08:44) >  EXAM: [id number] - MR SPINE CERVICAL  - ORDERED BY: RICKIE NOLASCO      PROCEDURE DATE:  02/12/2022           INTERPRETATION:  EXAMINATION:MR CERVICAL SPINE    CLINICAL INDICATION:neck pain with increasing radiculopathy;    TECHNIQUE: Multi-planar, multi-sequence MR of the cervical spine was   performed without intravenous contrast.    COMPARISON: None.    INTERPRETATION:    BONES AND BONE MARROW:  There is no evidence of fracture.  There is no   focal bone marrow edema or marrow replacement.  INTERVERTEBRAL DISCS:  There is multilevel disc desiccation throughout   the cervical spine with variable loss of disc height  ALIGNMENT:  The craniocervical junction and atlantoaxial intervals are   maintained.   The spinal alignment is maintained.  SPINAL CORD: The spinal cord is normal in size and signal intensity.  EXTRASPINAL: There is no prevertebral soft tissue swelling.   There is no   epidural mass or fluid collection.  The paraspinal and included   extraspinal soft tissues are unremarkable.    Evaluation of individual disc space levels demonstrates the following:    C2-C3: Left foraminal posterior disc osteophyte complex with moderate   left neural foraminal stenosis. No right neural foraminal or central   stenosis.  C3-C4: Right foraminal posterior disc osteophyte complex with mild right   neural foraminal stenosis. Mild central protrusion is also present. Mild   central stenosis. No left neural foraminal stenosis.  C4-C5: Central posterior disc osteophyte complex is present with mild   facet arthropathy. Moderate left neural foraminal stenosis. No right   neural foraminal stenosis. Mild central stenosis.  C5-C6: Broad-based posterior disc osteophyte complex with mild left   neural foraminal stenosis and mild central stenosis.  C6-C7: Broad-based posterior disc osteophyte complex. No central canal or   neural foraminal stenosis.  C7-T1: Left foraminal posterior disc osteophyte complex. Moderate left   neural foraminal stenosis. No retrocrural foraminal stenosis or central   stenosis.    IMPRESSION:  Multilevel cervical spondylosis, with mild central stenosis at C3-4   through C5-C6 levels. Variable mild/moderate neural foraminal stenosis as   above.    --- End of Report ---               SHLOMIT A GOLDBERG-STEIN MD; Attending Radiologist   This document has been electronically signed. Feb 15 2022  9    < end of copied text >      A/P: 56yMale with cervical stenosis and RUE radiculopathy  - Pain control  - WBAT with assistive devices as needed  - No concern for acute cord compression at this time  - FU with outpatient neurologist  - Give 10mg IV decadron x1, then DC with medrol dose pack  - Signs and symptoms of cord compression explained to the patient who is aware to return to the ED with acutely worsening pain, numbness, tingling, weakness, bowel/bladder incontinence, etc  - FU with Dr. Nolasco as previously scheduled

## 2022-02-19 NOTE — ED PROVIDER NOTE - NSFOLLOWUPINSTRUCTIONS_ED_ALL_ED_FT
See your Spinal Doctor and Neurologist this week for follow up -- call to discuss.    Stay well hydrated, eat regular healthy meals, get plenty of rest, continue current medications.    See CERVICAL RADICULOPATHY information and return instructions given to you.    Seek immediate medical care for new/worsening symptoms/concerns. See your Spinal Doctor and Neurologist Dr. Beau Feldman, [phone], this week for follow up -- call to discuss.    Stay well hydrated, eat regular healthy meals, get plenty of rest, continue current medications.    See CERVICAL RADICULOPATHY information and return instructions given to you.    Seek immediate medical care for new/worsening symptoms/concerns.

## 2022-02-19 NOTE — ED ADULT NURSE NOTE - NSIMPLEMENTINTERV_GEN_ALL_ED
Implemented All Universal Safety Interventions:  [place] to call system. Call bell, personal items and telephone within reach. Instruct patient to call for assistance. Room bathroom lighting operational. Non-slip footwear when patient is off stretcher. Physically safe environment: no spills, clutter or unnecessary equipment. Stretcher in lowest position, wheels locked, appropriate side rails in place.

## 2022-02-19 NOTE — ED PROVIDER NOTE - PATIENT PORTAL LINK FT
You can access the FollowMyHealth Patient Portal offered by [facility] by registering at the following website: [email or web address]. By joining [employer]’s FollowMyHealth portal, you will also be able to view your health information using other applications (apps) compatible with our system.

## 2022-02-19 NOTE — ED ADULT NURSE NOTE - EXTENSIONS OF SELF_ADULT
Layo has been approved to the assistance program for Lyrica through [employer]. Layo will receive this medication at no cost through the enrollment period December 31, 2020.    A 90 day supply of LYRICA will be delivered to Layo's home within 5-7 business days. Layo has been informed of this approval.    Layo will contact my office for refills as we must work directly with the .  I will note EPIC as each refill is requested.    Thank you,    Ana Luisa Cabrera  Prescription Assistance     
Sounds good - thanks  SN  
None

## 2022-02-19 NOTE — ED PROVIDER NOTE - CARE PROVIDER_API CALL
Beau Feldman)  Neurology  [address], [address]  [place] [address]  Phone: [phone]  Fax: [phone]  Follow Up Time:

## 2022-02-19 NOTE — ED ADULT NURSE NOTE - OBJECTIVE STATEMENT
56M, AAO3, c/o ongoing RUE pain x2 weeks w/ numbness tingling, arrived wearing own soft collar. Symptoms radiate from shoulder to finger tips. Seen in [facility] 2 weeks ago for same complaints, outpt MRI negative. Report new pain to L wrist for the past few days. Able to move RUE w/ difficulty, + pulses intact. Denies headaches/dizziness/visual changes. Speaking clear in full sentences. Denies recent trauma or injury. RR even and unlabored, skin appropriate for age and race.

## 2022-02-19 NOTE — ED PROVIDER NOTE - PHYSICAL EXAMINATION
NAD, VSS, Afebrile, Lungs clear. ABD soft, non tender. No spinal midline tender. + Right cervical trapezium tender without obvious swelling or lesions. No CVA tender. Sensory intact. RUE 4/5 strength. LUE/RLE/LLE with 5/5 strength.

## 2022-02-19 NOTE — ED PROVIDER NOTE - ATTENDING CONTRIBUTION TO CARE
------------ATTENDING NOTE------------   RHD pt c/o increased constant waxing/waning severe stabbing electric shooting pains in R neck down to R hand, c/w cervical radiculopathy confirmed by MRI, is nvi w/ bcr distally, no edema/skin changes, awaiting labs, pain control, Ortho Spine consult -->  - Stanley Chan MD   --------------------------------------------- ------------ATTENDING NOTE------------   RHD pt c/o increased constant waxing/waning severe stabbing electric shooting pains in R neck down to R hand, c/w cervical radiculopathy confirmed by MRI, is nvi w/ bcr distally, no edema/skin changes, awaiting labs, pain control, Ortho Spine consult -->   - Stanley Chan MD   ---------------------------------------------

## 2022-02-19 NOTE — ED ADULT TRIAGE NOTE - CHIEF COMPLAINT QUOTE
rt arm pain x 2 weeks Seen in ED 2/14 C/o numbness weakness Has seen spinal MD neg results Had MRI   Tingling rt fingers To see Neurologist in March

## 2022-03-11 ENCOUNTER — APPOINTMENT (OUTPATIENT)
Dept: ORTHOPEDIC SURGERY | Facility: CLINIC | Age: 57
End: 2022-03-11

## 2022-03-17 ENCOUNTER — APPOINTMENT (OUTPATIENT)
Dept: ENDOCRINOLOGY | Facility: CLINIC | Age: 57
End: 2022-03-17
Payer: COMMERCIAL

## 2022-03-17 VITALS
WEIGHT: 226.17 LBS | SYSTOLIC BLOOD PRESSURE: 128 MMHG | DIASTOLIC BLOOD PRESSURE: 75 MMHG | BODY MASS INDEX: 33.12 KG/M2 | HEIGHT: 69.29 IN | HEART RATE: 76 BPM | OXYGEN SATURATION: 98 % | TEMPERATURE: 96 F

## 2022-03-17 DIAGNOSIS — R73.03 PREDIABETES.: ICD-10-CM

## 2022-03-17 DIAGNOSIS — E27.8 OTHER SPECIFIED DISORDERS OF ADRENAL GLAND: ICD-10-CM

## 2022-03-17 DIAGNOSIS — R82.5 ELEVATED URINE LEVELS OF DRUGS, MEDICAMENTS AND BIOLOGICAL SUBSTANCES: ICD-10-CM

## 2022-03-17 PROCEDURE — 99215 OFFICE O/P EST HI 40 MIN: CPT

## 2022-03-17 RX ORDER — PREDNISONE 10 MG/1
10 TABLET ORAL
Qty: 39 | Refills: 0 | Status: COMPLETED | COMMUNITY
Start: 2022-02-17 | End: 2022-03-17

## 2022-03-17 RX ORDER — PREDNISONE 10 MG/1
10 TABLET ORAL
Qty: 39 | Refills: 0 | Status: COMPLETED | COMMUNITY
Start: 2022-02-07 | End: 2022-03-17

## 2022-03-17 NOTE — PHYSICAL EXAM
[Alert] : alert [Well Nourished] : well nourished [Healthy Appearance] : healthy appearance [No Acute Distress] : no acute distress [Well Developed] : well developed [Normal Voice/Communication] : normal voice communication [Normal Sclera/Conjunctiva] : normal sclera/conjunctiva [No Proptosis] : no proptosis [No Respiratory Distress] : no respiratory distress [No Edema] : no peripheral edema [Normal Rate] : heart rate was normal [Pedal Pulses Normal] : the pedal pulses are present [Not Tender] : non-tender [Not Distended] : not distended [Right foot was examined, including] : right foot ~C was examined, including visual inspection with sensory and pulse exams [Left foot was examined, including] : left foot ~C was examined, including visual inspection with sensory and pulse exams [Normal] : normal [2+] : 2+ in the dorsalis pedis [Diminished Throughout Both Feet] : normal tactile sensation with monofilament testing throughout both feet [Normal Sensation on Monofilament Testing] : normal sensation on monofilament testing of lower extremities [Normal Affect] : the affect was normal [Normal Insight/Judgement] : insight and judgment were intact [Normal Mood] : the mood was normal [de-identified] : Unable to examine neck as he is wearing neck brace

## 2022-03-17 NOTE — REVIEW OF SYSTEMS
[Recent Weight Loss (___ Lbs)] : recent weight loss: [unfilled] lbs [Neck Pain] : neck pain [SOB on Exertion] : shortness of breath on exertion [All other systems negative] : All other systems negative [de-identified] : Numbness in fingers

## 2022-03-17 NOTE — HISTORY OF PRESENT ILLNESS
[FreeTextEntry1] : CC: Diabetes\par This is a 56-year-old male with type 2 diabetes mellitus, hypertension, obesity, bilateral adrenal adenomas, history of elevated 24-hour urine dopamine level, kidney stones, elevated transaminase levels, cryptococcal lung infection, here for consultation.\par He underwent imaging studies in March 2019 after he experienced abdominal pain and was diagnosed with kidney stones.  MRI found incidental pulmonary nodules and bilateral adrenal nodules.  He underwent right VATS/right lower lobe wedge resection and was found to have cryptococcus.\par Adrenal MRI abdomen from June 2019 showed left 10 mm adrenal adenoma and right 9 mm adrenal adenoma.\par PET scan from July 2019 showed right 9 mm adrenal adenoma and left 10 mm adrenal adenoma.\par Hormonal work-up for adrenal adenomas was unrevealing except for elevated 24-hour urine dopamine of 771 mcg (upper limit normal 480 mcg).  This elevation was thought to be due to stress as it was collected the day before his lung surgery as well as taking amlodipine and inhaled albuterol.\par Diabetes was diagnosed in January 2022.  He was on prednisone as well as methylprednisolone in February 2022 for neck pain.\par He was started on Metformin 500 mg 2 times a day.  Denies side effects to Metformin.\par He self monitors blood glucose 1 time a day and reports fasting levels between 100-140s.\par He has not had a dilated eye exam to evaluate for diabetic retinopathy.  He has an ophthalmology appointment next week.\par He has neuropathy in his fingers which he attributes to carpal tunnel and cervical spine disease.\par Denies nephropathy.\par He is not on a statin.\par He is not on an ACE inhibitor or ARB.\par \par

## 2022-03-17 NOTE — REASON FOR VISIT
[Consultation] : a consultation visit [Adrenal Evaluation/Adrenal Disorder] : adrenal evaluation/adrenal disorder [DM Type 2] : DM Type 2 [FreeTextEntry2] : Dr Sepulveda

## 2022-03-17 NOTE — ASSESSMENT
[FreeTextEntry1] : This is a 56-year-old male with type 2 diabetes mellitus, hypertension, obesity, bilateral adrenal adenomas, history of elevated 24-hour urine dopamine level, kidney stones, elevated transaminase levels, cryptococcal lung infection, here for consultation.\par 1.  Bilateral adrenal adenomas\par He underwent imaging studies in March 2019 after he experienced abdominal pain and was diagnosed with kidney stones.  MRI found incidental pulmonary nodules and bilateral adrenal nodules. \par Adrenal MRI abdomen from June 2019 showed left 10 mm adrenal adenoma and right 9 mm adrenal adenoma.\par PET scan from July 2019 showed right 9 mm adrenal adenoma and left 10 mm adrenal adenoma.\par Hormonal work-up for adrenal adenomas was unrevealing except for elevated 24-hour urine dopamine of 771 mcg (upper limit normal 480 mcg).  This elevation was thought to be due to stress as it was collected the day before his lung surgery as well as taking amlodipine and inhaled albuterol.\par Check for hormonal hyperfunction.\par Check MRI adrenal protocol.\par 2.  T2DM\par He is on Metformin 500 mg 2 times a day. \par He self monitors blood glucose 1 time a day and reports fasting levels between 100-140s.\par Check hemoglobin A1c.\par Check vitamin B12 as he is on Metformin.\par Will consider GLP-1 agonist for potential benefit of weight loss.  GI side effects reviewed.  Denies history of pancreatitis.\par There is no personal family history of thyroid cancer.\par He has not had a dilated eye exam to evaluate for diabetic retinopathy.  He has an ophthalmology appointment next week.\par He has neuropathy in his fingers which he attributes to carpal tunnel and cervical spine disease.\par Denies nephropathy.  Check urine microalbumin.\par He is not on a statin.  Check lipid panel.\par He is not on an ACE inhibitor or ARB.\par 3.  Hypertension\par Controlled\par 4.  Obesity\par Lifestyle modification.\par \par

## 2022-03-18 ENCOUNTER — APPOINTMENT (OUTPATIENT)
Dept: NEUROLOGY | Facility: CLINIC | Age: 57
End: 2022-03-18
Payer: COMMERCIAL

## 2022-03-18 VITALS
SYSTOLIC BLOOD PRESSURE: 130 MMHG | HEIGHT: 69 IN | HEART RATE: 80 BPM | BODY MASS INDEX: 33.47 KG/M2 | DIASTOLIC BLOOD PRESSURE: 78 MMHG | WEIGHT: 226 LBS

## 2022-03-18 DIAGNOSIS — M54.12 RADICULOPATHY, CERVICAL REGION: ICD-10-CM

## 2022-03-18 LAB
ALBUMIN SERPL ELPH-MCNC: 4.8 G/DL
ALDOSTERONE SERUM: 17.6 NG/DL
ALP BLD-CCNC: 75 U/L
ALT SERPL-CCNC: 83 U/L
ANION GAP SERPL CALC-SCNC: 15 MMOL/L
AST SERPL-CCNC: 43 U/L
BASOPHILS # BLD AUTO: 0.06 K/UL
BASOPHILS NFR BLD AUTO: 0.6 %
BILIRUB SERPL-MCNC: 0.4 MG/DL
BUN SERPL-MCNC: 16 MG/DL
CALCIUM SERPL-MCNC: 9.4 MG/DL
CHLORIDE SERPL-SCNC: 103 MMOL/L
CHOLEST SERPL-MCNC: 161 MG/DL
CO2 SERPL-SCNC: 25 MMOL/L
CORTIS SERPL-MCNC: 8.3 UG/DL
CREAT SERPL-MCNC: 0.98 MG/DL
CREAT SPEC-SCNC: 255 MG/DL
DHEA-S SERPL-MCNC: 130 UG/DL
EGFR: 90 ML/MIN/1.73M2
EOSINOPHIL # BLD AUTO: 0.21 K/UL
EOSINOPHIL NFR BLD AUTO: 2 %
ESTIMATED AVERAGE GLUCOSE: 146 MG/DL
GLUCOSE SERPL-MCNC: 111 MG/DL
HBA1C MFR BLD HPLC: 6.7 %
HCT VFR BLD CALC: 43.7 %
HDLC SERPL-MCNC: 40 MG/DL
HGB BLD-MCNC: 13.7 G/DL
IMM GRANULOCYTES NFR BLD AUTO: 0.4 %
LDLC SERPL CALC-MCNC: 63 MG/DL
LYMPHOCYTES # BLD AUTO: 3.06 K/UL
LYMPHOCYTES NFR BLD AUTO: 29.8 %
MAN DIFF?: NORMAL
MCHC RBC-ENTMCNC: 27.7 PG
MCHC RBC-ENTMCNC: 31.4 GM/DL
MCV RBC AUTO: 88.3 FL
MICROALBUMIN 24H UR DL<=1MG/L-MCNC: 6.8 MG/DL
MICROALBUMIN/CREAT 24H UR-RTO: 27 MG/G
MONOCYTES # BLD AUTO: 0.81 K/UL
MONOCYTES NFR BLD AUTO: 7.9 %
NEUTROPHILS # BLD AUTO: 6.09 K/UL
NEUTROPHILS NFR BLD AUTO: 59.3 %
NONHDLC SERPL-MCNC: 121 MG/DL
PLATELET # BLD AUTO: 331 K/UL
POTASSIUM SERPL-SCNC: 4.5 MMOL/L
PROT SERPL-MCNC: 7 G/DL
RBC # BLD: 4.95 M/UL
RBC # FLD: 15.1 %
SODIUM SERPL-SCNC: 143 MMOL/L
TRIGL SERPL-MCNC: 290 MG/DL
VIT B12 SERPL-MCNC: 953 PG/ML
WBC # FLD AUTO: 10.27 K/UL

## 2022-03-18 PROCEDURE — 95886 MUSC TEST DONE W/N TEST COMP: CPT

## 2022-03-18 PROCEDURE — 95912 NRV CNDJ TEST 11-12 STUDIES: CPT

## 2022-03-18 PROCEDURE — 99204 OFFICE O/P NEW MOD 45 MIN: CPT | Mod: 25

## 2022-03-18 NOTE — ASSESSMENT
[FreeTextEntry1] : Impression: This 56-year-old male patient based on his history neurological exam and electrodiagnostic testing has a presentation consistent with 2 findings.  First he has evidence of a significant degree of bilateral carpal tunnel syndrome.  Second he has evidence of a definite right cervical radiculopathy predominantly affecting the C7 nerve root with findings with testing of the right triceps muscle and the right cervical paraspinal muscles as detailed above.  Suggest correlation with cervical MRI scan.\par \par Recommendations: Agree with treatment and spine surgical follow-up.  Consider bilateral carpal tunnel release surgery.\par

## 2022-03-18 NOTE — PHYSICAL EXAM
[FreeTextEntry1] : Head:  Normocephalic Neck: Reduced range of motion nontender no carotid bruits. \par \par Mental Status:  Alert Oriented X3 Speech normal and no aphasia or dysarthria.\par \par Cranial Nerves:  PERRL, Visual Fields full  EOMI no diplopia no ptosis no nystagmus, V through XII intact.\par \par Motor: Mild weakness of right triceps.\par \par DTRs: Symmetric generally hypoactive..  Plantars flexor.  No Clonus.\par \par Sensory: No definite findings to pin and touch.\par \par Gait: Unremarkable.\par \par Tinel's sign negative both wrists.\par

## 2022-03-18 NOTE — REASON FOR VISIT
[Initial Evaluation] : an initial evaluation [FreeTextEntry1] : Neurological consult and electrodiagnostic exam

## 2022-03-18 NOTE — CONSULT LETTER
[Dear  ___] : Dear  [unfilled], [Consult Letter:] : I had the pleasure of evaluating your patient, [unfilled]. [Please see my note below.] : Please see my note below. [Consult Closing:] : Thank you very much for allowing me to participate in the care of this patient.  If you have any questions, please do not hesitate to contact me. [Sincerely,] : Sincerely, [FreeTextEntry3] : Lalo Gomez MD\par

## 2022-03-18 NOTE — PROCEDURE
[FreeTextEntry1] : Electrodiagnostic exam was performed of both upper extremities and the cervical paraspinal muscles.  The left and right median motor nerves showed prolonged distal latencies and reduced amplitude.  The left and right median sensory nerve showed prolonged distal peak latencies.  Needle examination of the right triceps muscle revealed acute denervation and there were polyphasics and reduced recruitment and incomplete interference pattern.  The right cervical paraspinal muscles at C6-7 and C7-8 levels showed increased insertional activity and increased spontaneous activity.\par \par This is an abnormal study consistent with a significant degree of bilateral carpal tunnel syndrome and a cervical radiculopathy predominantly affecting the right C7 nerve root with acute denervation and chronic neuropathic change.\par \par Please see the accompanying report.

## 2022-03-24 ENCOUNTER — APPOINTMENT (OUTPATIENT)
Dept: INTERNAL MEDICINE | Facility: CLINIC | Age: 57
End: 2022-03-24
Payer: COMMERCIAL

## 2022-03-24 ENCOUNTER — APPOINTMENT (OUTPATIENT)
Dept: ENDOCRINOLOGY | Facility: CLINIC | Age: 57
End: 2022-03-24
Payer: COMMERCIAL

## 2022-03-24 DIAGNOSIS — R91.1 SOLITARY PULMONARY NODULE: ICD-10-CM

## 2022-03-24 PROCEDURE — 99212 OFFICE O/P EST SF 10 MIN: CPT | Mod: 95

## 2022-03-24 PROCEDURE — G0108 DIAB MANAGE TRN  PER INDIV: CPT | Mod: 95

## 2022-03-24 NOTE — ASSESSMENT
[FreeTextEntry1] : ROBBY HUBER  is a 56 year old male  with history of HTN, preDM, lung nodule s/p RVATS and RLL wedge resection and found to have cryptococcus now on treatment, adrenal nodule, intracranial hemangioma, asthma, hypertriglyceridemia  presented today for COVID infection.\par \par #COVID-19 infection without risk factors for severe infection\par Tested COVID positive on 3/22/22.\par sx started from 3/17/22.\par Rent Rx for Albuterol inhalator. \par Advised patient to stay at home and rest. Advised calling the office if a high fever develops, if she becomes short of breath, or develops severe or worsening cough. Advised patient to go to the ER if experiencing trouble breathing or if shortness of breath is severe. Advised staying well hydrated with at least 6-8 glasses of water/fluid daily, try to eat small meals even if without an appetite, and to call the office for any issues or concerns. Advised getting Pulse oximeter to monitor O2 sats. If SpO2<90% should go to ED for oxygen therapy. \par Needs to quarantine for minimum 5 days until asymptomatic and no fever without any fever lowering medications. \par Placed on 24 hour call back list.\par \par

## 2022-03-24 NOTE — HISTORY OF PRESENT ILLNESS
[FreeTextEntry1] : f/u for COVID positive [de-identified] : ROBBY HUBER  is a 56 year old male  with history of HTN, preDM, lung nodule s/p RVATS and RLL wedge resection and found to have cryptococcus now on treatment, adrenal nodule, intracranial hemangioma, asthma, hypertriglyceridemia  presented today for COVID infection.\par \par Reports that they tested positive on 3/22/22.\par He was tested negative for COVID rapid test on 3/22/22 but PCR test on the same day came back positive. \par \par COVID vaccination: fully vaccinated and got booster vaccine.\par Quarantine: pt is using a separate room and a toilet in house. Family members( son) are asymptomatic and son's PCR result is pending. \par \par Patient reports exposure to known case of Covid 19: No\par Date symptoms started: 3/17/22\par Today is day 8.\par \par Symptoms include: Nasal congestion, sore throat, cough, fatigue.\par Denies loss of smell and taste, fever, myalgias, shortness of breath, CP, chest pressure, leg swelling, dyspnea on exertion, diarrhea, nausea and vomiting. \par Taking the following medications for symptom relief: did not try. \par

## 2022-03-24 NOTE — PLAN
[FreeTextEntry1] : Supportive care with Tylenol/ibuprofen as needed, drink plenty of fluids, Mucinex DM for cough, keep windows cracked open and a fan on. Consider pronation and frequent ambulation. Discussed need for isolation and quarantine for those exposed but testing pending.\par \par Discussed with patient criteria for monoclonal antibody therapy: not applicable at this time. Pt does not have immunocompromised high risk condition. \par \par Discussed with patient criteria for Oral Treatment including Paxlovid, Molnupiravir: not applicable at this time. Sx started 7days ago.

## 2022-03-24 NOTE — PHYSICAL EXAM
[de-identified] : TEB, pt seems mildly ill. No respiratory distress. Pt could speak in full sentences.

## 2022-03-25 LAB
DOPAMINE UR-MCNC: <30 PG/ML
EPINEPH UR-MCNC: 44 PG/ML
NOREPINEPH UR-MCNC: 586 PG/ML

## 2022-03-28 LAB
CORTIS 24H UR-MCNC: 24 H
CORTIS 24H UR-MRATE: 26 MCG/24 H
SPECIMEN VOL 24H UR: 850 ML

## 2022-03-30 ENCOUNTER — NON-APPOINTMENT (OUTPATIENT)
Age: 57
End: 2022-03-30

## 2022-03-31 LAB
METANEPHRINE, PL: 14.9 PG/ML
NORMETANEPHRINE, PL: 138.4 PG/ML
RENIN ACTIVITY, PLASMA: 2.03 NG/ML/HR

## 2022-04-25 ENCOUNTER — OUTPATIENT (OUTPATIENT)
Dept: OUTPATIENT SERVICES | Facility: HOSPITAL | Age: 57
LOS: 1 days | End: 2022-04-25

## 2022-04-25 VITALS
RESPIRATION RATE: 16 BRPM | WEIGHT: 222.01 LBS | HEART RATE: 87 BPM | OXYGEN SATURATION: 99 % | DIASTOLIC BLOOD PRESSURE: 80 MMHG | HEIGHT: 70 IN | SYSTOLIC BLOOD PRESSURE: 140 MMHG | TEMPERATURE: 98 F

## 2022-04-25 DIAGNOSIS — G56.00 CARPAL TUNNEL SYNDROME, UNSPECIFIED UPPER LIMB: ICD-10-CM

## 2022-04-25 DIAGNOSIS — Z41.9 ENCOUNTER FOR PROCEDURE FOR PURPOSES OTHER THAN REMEDYING HEALTH STATE, UNSPECIFIED: Chronic | ICD-10-CM

## 2022-04-25 DIAGNOSIS — I10 ESSENTIAL (PRIMARY) HYPERTENSION: ICD-10-CM

## 2022-04-25 DIAGNOSIS — Z91.89 OTHER SPECIFIED PERSONAL RISK FACTORS, NOT ELSEWHERE CLASSIFIED: ICD-10-CM

## 2022-04-25 DIAGNOSIS — G56.01 CARPAL TUNNEL SYNDROME, RIGHT UPPER LIMB: ICD-10-CM

## 2022-04-25 DIAGNOSIS — E11.9 TYPE 2 DIABETES MELLITUS WITHOUT COMPLICATIONS: ICD-10-CM

## 2022-04-25 DIAGNOSIS — Z98.890 OTHER SPECIFIED POSTPROCEDURAL STATES: Chronic | ICD-10-CM

## 2022-04-25 RX ORDER — ALBUTEROL 90 UG/1
2 AEROSOL, METERED ORAL
Qty: 0 | Refills: 0 | DISCHARGE

## 2022-04-25 RX ORDER — AMLODIPINE BESYLATE 2.5 MG/1
1 TABLET ORAL
Qty: 0 | Refills: 0 | DISCHARGE

## 2022-04-25 NOTE — H&P PST ADULT - HISTORY OF PRESENT ILLNESS
56 year old male presents to presurgical testing with diagnosis of carpal tunnel syndrome, right hand scheduled for right endoscopic release of carpal tunnel. Pt with bilateral carpal tunnel syndrome, with long standing right hand pain and dysfunction with persistent symptoms despite conservative management.
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
## 2022-04-25 NOTE — H&P PST ADULT - NSICDXFAMILYHX_GEN_ALL_CORE_FT
FAMILY HISTORY:  Father  Still living? Unknown  FH: type 2 diabetes, Age at diagnosis: Age Unknown    Mother  Still living? Unknown  FH: lung cancer, Age at diagnosis: Age Unknown  FH: type 2 diabetes, Age at diagnosis: Age Unknown

## 2022-04-25 NOTE — H&P PST ADULT - NEGATIVE NEUROLOGICAL SYMPTOMS
no transient paralysis/no weakness/no generalized seizures/no focal seizures/no syncope/no tremors/no difficulty walking

## 2022-04-25 NOTE — H&P PST ADULT - PROBLEM SELECTOR PLAN 2
Patient instructed to hold Metformin the night before and the morning of procedure. Pt stated understanding.

## 2022-04-25 NOTE — H&P PST ADULT - MUSCULOSKELETAL
right hand carpal tunnel syndrome/no joint swelling/no joint erythema/no joint warmth/no calf tenderness/normal strength/decreased ROM due to pain details… detailed exam

## 2022-04-25 NOTE — H&P PST ADULT - NSICDXPASTMEDICALHX_GEN_ALL_CORE_FT
PAST MEDICAL HISTORY:  Adrenal nodule found incidentally, monitored    Allergic Asthma induced by Cat dandur    Arthritis     Cellulitis     Cervical spondylosis     COVID-19 virus infection positive on 3/22/22    Diabetes mellitus     Esophageal reflux     Fatty liver     fracture Right tibia s/p surgery    H/O carpal tunnel syndrome right    HTN (hypertension)     Labyrinthitis     Lumbar radiculopathy     Lung nodule rll, had cryptococcus infection, treated, s/p surgery    Nephrolithiasis left, monitored    Psoriasis     Sinusitis s/p

## 2022-04-25 NOTE — H&P PST ADULT - PROBLEM SELECTOR PLAN 1
Patient tentatively scheduled for right endoscopic release of carpal tunnel for 5/9/22. Pre-op instructions provided. Pt given verbal and written instructions with teach back on chlorhexidine shampoo and pepcid. Pt verbalized understanding with return demonstration.     Recent COVID-19 infection on 3/22/22  Pt strongly advised to follow up with surgeon to discuss COVID testing requirements prior to procedure. Patient tentatively scheduled for right endoscopic release of carpal tunnel for 5/9/22. Pre-op instructions provided. Pt given verbal and written instructions with teach back on chlorhexidine shampoo and pepcid. Pt verbalized understanding with return demonstration.     Recent COVID-19 infection on 3/22/22  Pt strongly advised to follow up with surgeon to discuss COVID testing requirements prior to procedure.    57 y/o M scheduled for low risk procedure. HTN asthma and DM type 2 are well controlled, good mets. No further evaluations requested.

## 2022-05-03 ENCOUNTER — APPOINTMENT (OUTPATIENT)
Dept: INTERNAL MEDICINE | Facility: CLINIC | Age: 57
End: 2022-05-03
Payer: COMMERCIAL

## 2022-05-03 ENCOUNTER — NON-APPOINTMENT (OUTPATIENT)
Age: 57
End: 2022-05-03

## 2022-05-03 DIAGNOSIS — Z01.818 ENCOUNTER FOR OTHER PREPROCEDURAL EXAMINATION: ICD-10-CM

## 2022-05-03 PROBLEM — N20.0 CALCULUS OF KIDNEY: Chronic | Status: ACTIVE | Noted: 2019-08-09

## 2022-05-03 PROBLEM — M54.16 RADICULOPATHY, LUMBAR REGION: Chronic | Status: ACTIVE | Noted: 2022-04-25

## 2022-05-03 PROBLEM — K76.0 FATTY (CHANGE OF) LIVER, NOT ELSEWHERE CLASSIFIED: Chronic | Status: ACTIVE | Noted: 2022-04-25

## 2022-05-03 PROBLEM — J32.9 CHRONIC SINUSITIS, UNSPECIFIED: Chronic | Status: ACTIVE | Noted: 2019-08-08

## 2022-05-03 PROBLEM — E11.9 TYPE 2 DIABETES MELLITUS WITHOUT COMPLICATIONS: Chronic | Status: ACTIVE | Noted: 2022-04-25

## 2022-05-03 PROBLEM — U07.1 COVID-19: Chronic | Status: ACTIVE | Noted: 2022-04-25

## 2022-05-03 PROBLEM — E27.8 OTHER SPECIFIED DISORDERS OF ADRENAL GLAND: Chronic | Status: ACTIVE | Noted: 2022-04-25

## 2022-05-03 PROBLEM — M47.812 SPONDYLOSIS WITHOUT MYELOPATHY OR RADICULOPATHY, CERVICAL REGION: Chronic | Status: ACTIVE | Noted: 2022-04-25

## 2022-05-03 PROBLEM — R91.1 SOLITARY PULMONARY NODULE: Chronic | Status: ACTIVE | Noted: 2019-08-08

## 2022-05-03 PROCEDURE — 99443: CPT

## 2022-05-04 PROBLEM — Z01.818 PREOPERATIVE EXAMINATION: Status: ACTIVE | Noted: 2019-08-07

## 2022-05-10 ENCOUNTER — OUTPATIENT (OUTPATIENT)
Dept: OUTPATIENT SERVICES | Facility: HOSPITAL | Age: 57
LOS: 1 days | End: 2022-05-10
Payer: COMMERCIAL

## 2022-05-10 ENCOUNTER — APPOINTMENT (OUTPATIENT)
Dept: MRI IMAGING | Facility: CLINIC | Age: 57
End: 2022-05-10
Payer: COMMERCIAL

## 2022-05-10 ENCOUNTER — APPOINTMENT (OUTPATIENT)
Dept: INTERNAL MEDICINE | Facility: CLINIC | Age: 57
End: 2022-05-10
Payer: COMMERCIAL

## 2022-05-10 VITALS
BODY MASS INDEX: 33.47 KG/M2 | OXYGEN SATURATION: 99 % | HEIGHT: 69 IN | DIASTOLIC BLOOD PRESSURE: 76 MMHG | SYSTOLIC BLOOD PRESSURE: 128 MMHG | WEIGHT: 226 LBS | HEART RATE: 96 BPM

## 2022-05-10 DIAGNOSIS — E66.9 OBESITY, UNSPECIFIED: ICD-10-CM

## 2022-05-10 DIAGNOSIS — Z00.8 ENCOUNTER FOR OTHER GENERAL EXAMINATION: ICD-10-CM

## 2022-05-10 DIAGNOSIS — E27.8 OTHER SPECIFIED DISORDERS OF ADRENAL GLAND: ICD-10-CM

## 2022-05-10 DIAGNOSIS — Z41.9 ENCOUNTER FOR PROCEDURE FOR PURPOSES OTHER THAN REMEDYING HEALTH STATE, UNSPECIFIED: Chronic | ICD-10-CM

## 2022-05-10 DIAGNOSIS — Z98.890 OTHER SPECIFIED POSTPROCEDURAL STATES: Chronic | ICD-10-CM

## 2022-05-10 DIAGNOSIS — E78.1 PURE HYPERGLYCERIDEMIA: ICD-10-CM

## 2022-05-10 PROCEDURE — 74183 MRI ABD W/O CNTR FLWD CNTR: CPT | Mod: 26

## 2022-05-10 PROCEDURE — A9585: CPT

## 2022-05-10 PROCEDURE — 99214 OFFICE O/P EST MOD 30 MIN: CPT

## 2022-05-10 PROCEDURE — 74183 MRI ABD W/O CNTR FLWD CNTR: CPT

## 2022-05-10 RX ORDER — METFORMIN HYDROCHLORIDE 500 MG/1
500 TABLET, COATED ORAL
Qty: 180 | Refills: 3 | Status: COMPLETED | COMMUNITY
Start: 2022-01-19 | End: 2022-05-10

## 2022-05-16 ENCOUNTER — NON-APPOINTMENT (OUTPATIENT)
Age: 57
End: 2022-05-16

## 2022-05-23 ENCOUNTER — NON-APPOINTMENT (OUTPATIENT)
Age: 57
End: 2022-05-23

## 2022-05-23 ENCOUNTER — APPOINTMENT (OUTPATIENT)
Dept: OPHTHALMOLOGY | Facility: CLINIC | Age: 57
End: 2022-05-23
Payer: COMMERCIAL

## 2022-05-23 PROCEDURE — 92004 COMPRE OPH EXAM NEW PT 1/>: CPT

## 2022-05-28 PROBLEM — E66.9 OBESITY (BMI 30-39.9): Status: ACTIVE | Noted: 2022-03-17

## 2022-05-28 NOTE — HISTORY OF PRESENT ILLNESS
[FreeTextEntry1] : Here for f/u of his HTN and update on his DM, weight [de-identified] : Doing all right - comes in for a scheduled f/u; we had prepared a preop letter for him, but his carpal tunnel surgery was postponed.  He has intercurrently felt fine.  Now on amlodipine 5 mg for bp, tolerating.  Has kept 10 pounds off his prior weights, w diet/metformin/ozempic.  Next A1C not quite due, sees vee for DM and for non functional adrenal nodule.

## 2022-05-28 NOTE — ASSESSMENT
[FreeTextEntry1] : 1) DM - seen by endo, last A1C 6.7.  Losing weight, LDL in goal, reminded ophtho/self foot inspection.  Microalb utd, negative.  2) HTN - in goal, will leave on amlodipine, pt interested in keeping check on his bp w old 2.5 mg tabs as well - can do that for a few days and leave at 2.5 daily if in goal.  Also would have low threshold for ACE/ARB given his DM.  3) on appropriate statin, rosuva 10 daily, has higher TG/avg HDL of metabolic syndrome, but LDL quant/qual addressed w rosuva.

## 2022-06-15 ENCOUNTER — APPOINTMENT (OUTPATIENT)
Dept: GASTROENTEROLOGY | Facility: CLINIC | Age: 57
End: 2022-06-15
Payer: COMMERCIAL

## 2022-06-15 VITALS
BODY MASS INDEX: 32.58 KG/M2 | HEART RATE: 98 BPM | WEIGHT: 220 LBS | OXYGEN SATURATION: 98 % | DIASTOLIC BLOOD PRESSURE: 60 MMHG | HEIGHT: 69 IN | SYSTOLIC BLOOD PRESSURE: 160 MMHG | TEMPERATURE: 98.7 F

## 2022-06-15 DIAGNOSIS — R74.8 ABNORMAL LEVELS OF OTHER SERUM ENZYMES: ICD-10-CM

## 2022-06-15 DIAGNOSIS — Z86.010 PERSONAL HISTORY OF COLONIC POLYPS: ICD-10-CM

## 2022-06-15 DIAGNOSIS — Z12.11 ENCOUNTER FOR SCREENING FOR MALIGNANT NEOPLASM OF COLON: ICD-10-CM

## 2022-06-15 PROCEDURE — 99204 OFFICE O/P NEW MOD 45 MIN: CPT

## 2022-06-15 NOTE — ASSESSMENT
[FreeTextEntry1] : This is a pleasant 56-year-old man presenting for colon cancer screening evaluation.  He underwent a colonoscopy over 5 years ago and he thinks there may have been a polyp removed.  I asked for a copy of the results faxed to me.  I explained to him the risks, alternatives and benefits to a colonoscopy.  Risk including but not limited to bleeding, perforation, infection and adverse medication reaction.  Reviewed recent blood work showing evidence of abnormal liver enzymes.  This may be due to fatty liver.  I recommend an abdominal ultrasound.  I explained to him the meaning of fatty liver disease.  Questions were answered.  He stated understanding.

## 2022-06-15 NOTE — HISTORY OF PRESENT ILLNESS
[FreeTextEntry1] : This is a pleasant 56-year-old man with history hypertension, hyperlipidemia and diabetes referred by Dr. Adrian Sorensen for consultation for colon cancer screening.  He reports undergoing a colonoscopy over 5 years ago reported to be with polyps.  I asked for a copy of the colonoscopy and pathology results faxed to me.  He denies family history of colon cancer.  He denies abdominal pain, nausea or vomiting.  He denies changes in bowel habits.  He denies rectal bleeding or melena.  He denies weight loss or anemia.

## 2022-06-15 NOTE — PHYSICAL EXAM
[General Appearance - Alert] : alert [General Appearance - In No Acute Distress] : in no acute distress [Outer Ear] : the ears and nose were normal in appearance [Auscultation Breath Sounds / Voice Sounds] : lungs were clear to auscultation bilaterally [Heart Rate And Rhythm] : heart rate was normal and rhythm regular [Heart Sounds] : normal S1 and S2 [Heart Sounds Gallop] : no gallops [Murmurs] : no murmurs [Heart Sounds Pericardial Friction Rub] : no pericardial rub [Bowel Sounds] : normal bowel sounds [Edema] : there was no peripheral edema [Abdomen Soft] : soft [Abdomen Tenderness] : non-tender [] : no hepato-splenomegaly [Abdomen Mass (___ Cm)] : no abdominal mass palpated [Skin Color & Pigmentation] : normal skin color and pigmentation [No Focal Deficits] : no focal deficits

## 2022-06-20 ENCOUNTER — APPOINTMENT (OUTPATIENT)
Dept: ENDOCRINOLOGY | Facility: CLINIC | Age: 57
End: 2022-06-20

## 2022-07-14 ENCOUNTER — APPOINTMENT (OUTPATIENT)
Dept: ENDOCRINOLOGY | Facility: CLINIC | Age: 57
End: 2022-07-14

## 2022-07-14 VITALS
DIASTOLIC BLOOD PRESSURE: 63 MMHG | TEMPERATURE: 98 F | SYSTOLIC BLOOD PRESSURE: 120 MMHG | BODY MASS INDEX: 32.24 KG/M2 | OXYGEN SATURATION: 96 % | WEIGHT: 222.7 LBS | HEIGHT: 69.49 IN | HEART RATE: 76 BPM

## 2022-07-14 DIAGNOSIS — D35.02 BENIGN NEOPLASM OF RIGHT ADRENAL GLAND: ICD-10-CM

## 2022-07-14 DIAGNOSIS — D35.01 BENIGN NEOPLASM OF RIGHT ADRENAL GLAND: ICD-10-CM

## 2022-07-14 LAB — GLUCOSE BLDC GLUCOMTR-MCNC: 167

## 2022-07-14 PROCEDURE — 99214 OFFICE O/P EST MOD 30 MIN: CPT | Mod: 25

## 2022-07-14 PROCEDURE — 36415 COLL VENOUS BLD VENIPUNCTURE: CPT

## 2022-07-14 RX ORDER — TIZANIDINE 2 MG/1
2 TABLET ORAL EVERY 6 HOURS
Qty: 28 | Refills: 0 | Status: COMPLETED | COMMUNITY
Start: 2022-02-07 | End: 2022-07-14

## 2022-07-14 RX ORDER — DICLOFENAC SODIUM 75 MG/1
75 TABLET, DELAYED RELEASE ORAL
Qty: 28 | Refills: 0 | Status: COMPLETED | COMMUNITY
Start: 2022-02-16 | End: 2022-07-14

## 2022-07-14 RX ORDER — TIZANIDINE 2 MG/1
2 TABLET ORAL EVERY 6 HOURS
Qty: 40 | Refills: 0 | Status: COMPLETED | COMMUNITY
Start: 2022-02-16 | End: 2022-07-14

## 2022-07-14 RX ORDER — MELOXICAM 15 MG/1
15 TABLET ORAL DAILY
Qty: 30 | Refills: 0 | Status: COMPLETED | COMMUNITY
Start: 2022-02-06 | End: 2022-07-14

## 2022-07-14 RX ORDER — TRAMADOL HYDROCHLORIDE 50 MG/1
50 TABLET, COATED ORAL
Qty: 15 | Refills: 0 | Status: COMPLETED | COMMUNITY
Start: 2022-02-10 | End: 2022-07-14

## 2022-07-14 RX ORDER — GABAPENTIN 300 MG/1
300 CAPSULE ORAL
Qty: 60 | Refills: 0 | Status: COMPLETED | COMMUNITY
Start: 2022-03-24 | End: 2022-07-14

## 2022-07-14 NOTE — PHYSICAL EXAM
[Alert] : alert [Well Nourished] : well nourished [Healthy Appearance] : healthy appearance [No Acute Distress] : no acute distress [Well Developed] : well developed [Normal Voice/Communication] : normal voice communication [Normal Sclera/Conjunctiva] : normal sclera/conjunctiva [No Proptosis] : no proptosis [No Neck Mass] : no neck mass was observed [No LAD] : no lymphadenopathy [Supple] : the neck was supple [No Respiratory Distress] : no respiratory distress [Pedal Pulses Normal] : the pedal pulses are present [No Edema] : no peripheral edema [Right foot was examined, including] : right foot ~C was examined, including visual inspection with sensory and pulse exams [Left foot was examined, including] : left foot ~C was examined, including visual inspection with sensory and pulse exams [Normal] : normal [2+] : 2+ in the dorsalis pedis [Diminished Throughout Both Feet] : normal tactile sensation with monofilament testing throughout both feet [Normal Sensation on Monofilament Testing] : normal sensation on monofilament testing of lower extremities [Normal Affect] : the affect was normal [Normal Insight/Judgement] : insight and judgment were intact [Normal Mood] : the mood was normal

## 2022-07-14 NOTE — HISTORY OF PRESENT ILLNESS
[FreeTextEntry1] : CC: Diabetes\par This is a 56-year-old male with type 2 diabetes mellitus, hypertension, obesity, hyperlipidemia, bilateral adrenal adenomas, history of elevated 24-hour urine dopamine level, kidney stones, elevated transaminase levels, cryptococcal lung infection, here for follow-up.\par He underwent imaging studies in March 2019 after he experienced abdominal pain and was diagnosed with kidney stones.  MRI found incidental pulmonary nodules and bilateral adrenal nodules.  He underwent right VATS/right lower lobe wedge resection and was found to have cryptococcus.\par Adrenal MRI abdomen from June 2019 showed left 10 mm adrenal adenoma and right 9 mm adrenal adenoma.\par PET scan from July 2019 showed right 9 mm adrenal adenoma and left 10 mm adrenal adenoma.\par Hormonal work-up for adrenal adenomas was unrevealing except for elevated 24-hour urine dopamine of 771 mcg (upper limit normal 480 mcg).  This elevation was thought to be due to stress as it was collected the day before his lung surgery as well as taking amlodipine and inhaled albuterol.\par Diabetes was diagnosed in January 2022.  He was on prednisone as well as methylprednisolone in February 2022 for neck pain.\par He is on metformin  mg daily and Ozempic 0.5 mg weekly.  \par He self monitors blood glucose 1 time a day and reports fasting levels between 120-150s.\par He saw his ophthalmologist in May 2022.  Denies retinopathy.\par Denies neuropathy.\par Denies nephropathy.\par He is on rosuvastatin 10 mg daily.\par He is not on an ACE inhibitor or ARB.\par \par

## 2022-07-18 ENCOUNTER — NON-APPOINTMENT (OUTPATIENT)
Age: 57
End: 2022-07-18

## 2022-07-18 LAB
ALBUMIN SERPL ELPH-MCNC: 4.9 G/DL
ALP BLD-CCNC: 97 U/L
ALT SERPL-CCNC: 65 U/L
ANION GAP SERPL CALC-SCNC: 12 MMOL/L
AST SERPL-CCNC: 34 U/L
BASOPHILS # BLD AUTO: 0.08 K/UL
BASOPHILS NFR BLD AUTO: 1 %
BILIRUB SERPL-MCNC: 0.4 MG/DL
BUN SERPL-MCNC: 15 MG/DL
CALCIUM SERPL-MCNC: 9.7 MG/DL
CHLORIDE SERPL-SCNC: 105 MMOL/L
CHOLEST SERPL-MCNC: 101 MG/DL
CO2 SERPL-SCNC: 24 MMOL/L
CREAT SERPL-MCNC: 0.9 MG/DL
CREAT SPEC-SCNC: 193 MG/DL
EGFR: 100 ML/MIN/1.73M2
EOSINOPHIL # BLD AUTO: 0.26 K/UL
EOSINOPHIL NFR BLD AUTO: 3.1 %
ESTIMATED AVERAGE GLUCOSE: 126 MG/DL
GLUCOSE SERPL-MCNC: 131 MG/DL
HBA1C MFR BLD HPLC: 6 %
HCT VFR BLD CALC: 45 %
HDLC SERPL-MCNC: 47 MG/DL
HGB BLD-MCNC: 14.5 G/DL
IMM GRANULOCYTES NFR BLD AUTO: 0.4 %
LDLC SERPL CALC-MCNC: 24 MG/DL
LYMPHOCYTES # BLD AUTO: 2.55 K/UL
LYMPHOCYTES NFR BLD AUTO: 30.5 %
MAN DIFF?: NORMAL
MCHC RBC-ENTMCNC: 28.2 PG
MCHC RBC-ENTMCNC: 32.2 GM/DL
MCV RBC AUTO: 87.5 FL
MICROALBUMIN 24H UR DL<=1MG/L-MCNC: 2.8 MG/DL
MICROALBUMIN/CREAT 24H UR-RTO: 15 MG/G
MONOCYTES # BLD AUTO: 0.66 K/UL
MONOCYTES NFR BLD AUTO: 7.9 %
NEUTROPHILS # BLD AUTO: 4.79 K/UL
NEUTROPHILS NFR BLD AUTO: 57.1 %
NONHDLC SERPL-MCNC: 55 MG/DL
PLATELET # BLD AUTO: 242 K/UL
POTASSIUM SERPL-SCNC: 4.2 MMOL/L
PROT SERPL-MCNC: 7.5 G/DL
RBC # BLD: 5.14 M/UL
RBC # FLD: 14.8 %
SODIUM SERPL-SCNC: 141 MMOL/L
TRIGL SERPL-MCNC: 154 MG/DL
VIT B12 SERPL-MCNC: 1086 PG/ML
WBC # FLD AUTO: 8.37 K/UL

## 2022-08-29 LAB — SARS-COV-2 N GENE NPH QL NAA+PROBE: NOT DETECTED

## 2022-08-31 ENCOUNTER — NON-APPOINTMENT (OUTPATIENT)
Age: 57
End: 2022-08-31

## 2022-09-01 ENCOUNTER — APPOINTMENT (OUTPATIENT)
Dept: GASTROENTEROLOGY | Facility: HOSPITAL | Age: 57
End: 2022-09-01

## 2022-09-09 ENCOUNTER — RX RENEWAL (OUTPATIENT)
Age: 57
End: 2022-09-09

## 2022-09-09 NOTE — HISTORY OF PRESENT ILLNESS
[FreeTextEntry1] : Darius Patterson is seen for an office consultation and electrodiagnostic exam.  He is a 56-year-old male who initially presented with neck pain radiating to the right triceps and extensor surface of the right forearm.  The pain and paresthesia also radiated predominantly to the second and third finger but also the fourth finger of the right hand with paresthesia.  Is improved with steroids and tramadol.  In addition he describes numbness and paresthesia in both hands.  He plays the [employer].  He is a newly diagnosed diabetic.  He is taking Metformin.  He did have cervical MRI performed revealing degenerative change and areas of foraminal narrowing as detailed in the report. Principal Discharge DX:	Febrile seizure   1

## 2022-10-13 PROBLEM — Z13.31 SCREENING FOR DEPRESSION: Status: ACTIVE | Noted: 2022-01-13

## 2022-12-12 ENCOUNTER — OUTPATIENT (OUTPATIENT)
Dept: OUTPATIENT SERVICES | Facility: HOSPITAL | Age: 57
LOS: 1 days | End: 2022-12-12

## 2022-12-12 VITALS
HEART RATE: 67 BPM | OXYGEN SATURATION: 98 % | WEIGHT: 223.11 LBS | RESPIRATION RATE: 16 BRPM | HEIGHT: 70 IN | SYSTOLIC BLOOD PRESSURE: 124 MMHG | TEMPERATURE: 95 F | DIASTOLIC BLOOD PRESSURE: 68 MMHG

## 2022-12-12 DIAGNOSIS — Z98.890 OTHER SPECIFIED POSTPROCEDURAL STATES: Chronic | ICD-10-CM

## 2022-12-12 DIAGNOSIS — G56.01 CARPAL TUNNEL SYNDROME, RIGHT UPPER LIMB: ICD-10-CM

## 2022-12-12 DIAGNOSIS — Z41.9 ENCOUNTER FOR PROCEDURE FOR PURPOSES OTHER THAN REMEDYING HEALTH STATE, UNSPECIFIED: Chronic | ICD-10-CM

## 2022-12-12 DIAGNOSIS — I10 ESSENTIAL (PRIMARY) HYPERTENSION: ICD-10-CM

## 2022-12-12 DIAGNOSIS — E11.9 TYPE 2 DIABETES MELLITUS WITHOUT COMPLICATIONS: ICD-10-CM

## 2022-12-12 LAB
A1C WITH ESTIMATED AVERAGE GLUCOSE RESULT: 5.4 % — SIGNIFICANT CHANGE UP (ref 4–5.6)
ALBUMIN SERPL ELPH-MCNC: 4.2 G/DL — SIGNIFICANT CHANGE UP (ref 3.3–5)
ALP SERPL-CCNC: 85 U/L — SIGNIFICANT CHANGE UP (ref 40–120)
ALT FLD-CCNC: 73 U/L — HIGH (ref 4–41)
ANION GAP SERPL CALC-SCNC: 11 MMOL/L — SIGNIFICANT CHANGE UP (ref 7–14)
AST SERPL-CCNC: 31 U/L — SIGNIFICANT CHANGE UP (ref 4–40)
BILIRUB SERPL-MCNC: 0.4 MG/DL — SIGNIFICANT CHANGE UP (ref 0.2–1.2)
BUN SERPL-MCNC: 13 MG/DL — SIGNIFICANT CHANGE UP (ref 7–23)
CALCIUM SERPL-MCNC: 9.6 MG/DL — SIGNIFICANT CHANGE UP (ref 8.4–10.5)
CHLORIDE SERPL-SCNC: 104 MMOL/L — SIGNIFICANT CHANGE UP (ref 98–107)
CO2 SERPL-SCNC: 25 MMOL/L — SIGNIFICANT CHANGE UP (ref 22–31)
CREAT SERPL-MCNC: 0.88 MG/DL — SIGNIFICANT CHANGE UP (ref 0.5–1.3)
EGFR: 100 ML/MIN/1.73M2 — SIGNIFICANT CHANGE UP
ESTIMATED AVERAGE GLUCOSE: 108 — SIGNIFICANT CHANGE UP
GLUCOSE SERPL-MCNC: 145 MG/DL — HIGH (ref 70–99)
HCT VFR BLD CALC: 39.8 % — SIGNIFICANT CHANGE UP (ref 39–50)
HGB BLD-MCNC: 13.2 G/DL — SIGNIFICANT CHANGE UP (ref 13–17)
MCHC RBC-ENTMCNC: 29.1 PG — SIGNIFICANT CHANGE UP (ref 27–34)
MCHC RBC-ENTMCNC: 33.2 GM/DL — SIGNIFICANT CHANGE UP (ref 32–36)
MCV RBC AUTO: 87.9 FL — SIGNIFICANT CHANGE UP (ref 80–100)
NRBC # BLD: 0 /100 WBCS — SIGNIFICANT CHANGE UP (ref 0–0)
NRBC # FLD: 0 K/UL — SIGNIFICANT CHANGE UP (ref 0–0)
PLATELET # BLD AUTO: 222 K/UL — SIGNIFICANT CHANGE UP (ref 150–400)
POTASSIUM SERPL-MCNC: 4.2 MMOL/L — SIGNIFICANT CHANGE UP (ref 3.5–5.3)
POTASSIUM SERPL-SCNC: 4.2 MMOL/L — SIGNIFICANT CHANGE UP (ref 3.5–5.3)
PROT SERPL-MCNC: 6.9 G/DL — SIGNIFICANT CHANGE UP (ref 6–8.3)
RBC # BLD: 4.53 M/UL — SIGNIFICANT CHANGE UP (ref 4.2–5.8)
RBC # FLD: 14.2 % — SIGNIFICANT CHANGE UP (ref 10.3–14.5)
SODIUM SERPL-SCNC: 140 MMOL/L — SIGNIFICANT CHANGE UP (ref 135–145)
WBC # BLD: 5.82 K/UL — SIGNIFICANT CHANGE UP (ref 3.8–10.5)
WBC # FLD AUTO: 5.82 K/UL — SIGNIFICANT CHANGE UP (ref 3.8–10.5)

## 2022-12-12 PROCEDURE — 93010 ELECTROCARDIOGRAM REPORT: CPT

## 2022-12-12 RX ORDER — ALBUTEROL 90 UG/1
2 AEROSOL, METERED ORAL
Qty: 0 | Refills: 0 | DISCHARGE

## 2022-12-12 RX ORDER — METFORMIN HYDROCHLORIDE 850 MG/1
1 TABLET ORAL
Qty: 0 | Refills: 0 | DISCHARGE

## 2022-12-12 RX ORDER — SODIUM CHLORIDE 9 MG/ML
1000 INJECTION, SOLUTION INTRAVENOUS
Refills: 0 | Status: DISCONTINUED | OUTPATIENT
Start: 2022-12-23 | End: 2023-01-06

## 2022-12-12 NOTE — H&P PST ADULT - HISTORY OF PRESENT ILLNESS
56 year old male presents to presurgical testing with diagnosis of carpal tunnel syndrome, right hand scheduled for right endoscopic release of carpal tunnel. Pt with bilateral carpal tunnel syndrome, with long standing right hand pain and dysfunction with persistent symptoms despite conservative management.  57 year old male presents to presurgical testing with diagnosis of carpal tunnel syndrome, right hand scheduled for right endoscopic release of carpal tunnel. Pt with bilateral carpal tunnel syndrome, with long standing right hand pain and dysfunction with persistent symptoms despite conservative management.

## 2022-12-12 NOTE — H&P PST ADULT - MUSCULOSKELETAL
right hand carpal tunnel syndrome/no joint swelling/no joint erythema/no joint warmth/no calf tenderness/decreased ROM due to pain details…
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
## 2022-12-12 NOTE — H&P PST ADULT - PROBLEM SELECTOR PLAN 1
Patient tentatively scheduled for    Pre-op instructions provided. Pt given verbal and written instructions with teach back on chlorhexidine shampoo and Pepcid. Pt verbalized understanding with return demonstration.    Pt has a scheduled preop COVID test.

## 2022-12-15 ENCOUNTER — APPOINTMENT (OUTPATIENT)
Dept: INTERNAL MEDICINE | Facility: CLINIC | Age: 57
End: 2022-12-15

## 2022-12-15 VITALS
SYSTOLIC BLOOD PRESSURE: 136 MMHG | OXYGEN SATURATION: 98 % | WEIGHT: 223 LBS | DIASTOLIC BLOOD PRESSURE: 78 MMHG | HEART RATE: 74 BPM | HEIGHT: 69.49 IN | BODY MASS INDEX: 32.29 KG/M2

## 2022-12-15 DIAGNOSIS — J45.909 UNSPECIFIED ASTHMA, UNCOMPLICATED: ICD-10-CM

## 2022-12-15 DIAGNOSIS — G56.03 CARPAL TUNNEL SYNDROM,BILATERAL UPPER LIMBS: ICD-10-CM

## 2022-12-15 DIAGNOSIS — Z01.818 ENCOUNTER FOR OTHER PREPROCEDURAL EXAMINATION: ICD-10-CM

## 2022-12-15 DIAGNOSIS — E26.9 HYPERALDOSTERONISM, UNSPECIFIED: ICD-10-CM

## 2022-12-15 DIAGNOSIS — D18.02 HEMANGIOMA OF INTRACRANIAL STRUCTURES: ICD-10-CM

## 2022-12-15 DIAGNOSIS — B45.9 CRYPTOCOCCOSIS, UNSPECIFIED: ICD-10-CM

## 2022-12-15 PROCEDURE — 99214 OFFICE O/P EST MOD 30 MIN: CPT

## 2022-12-15 RX ORDER — SODIUM SULFATE, POTASSIUM SULFATE, MAGNESIUM SULFATE 17.5; 3.13; 1.6 G/ML; G/ML; G/ML
17.5-3.13-1.6 SOLUTION, CONCENTRATE ORAL
Qty: 1 | Refills: 0 | Status: DISCONTINUED | COMMUNITY
Start: 2022-06-15 | End: 2022-12-15

## 2022-12-16 PROBLEM — B45.9 CRYPTOCOCCUS: Status: ACTIVE | Noted: 2019-08-22

## 2022-12-16 PROBLEM — G56.03 CARPAL TUNNEL SYNDROME, BILATERAL: Status: ACTIVE | Noted: 2022-03-18

## 2022-12-16 PROBLEM — Z01.818 PREOP TESTING: Status: ACTIVE | Noted: 2022-06-15

## 2022-12-16 PROBLEM — E26.9 HIGH ALDOSTERONE: Status: ACTIVE | Noted: 2022-03-25

## 2022-12-16 NOTE — RESULTS/DATA
[de-identified] : LAB at [facility] 12/12/22\par CMP: normal electrolyte, AST/ALT=31/73 eGFR 100\par HgbA1C 5.4%\par CBC: normal\par \par ECG 2/6/22 NSR 89 bpm and  12/12/22 NSR 64bpm

## 2022-12-16 NOTE — ASSESSMENT
[High Risk Surgery - Intraperitoneal, Intrathoracic or Supringuinal Vascular Procedures] : High Risk Surgery - Intraperitoneal, Intrathoracic or Supringuinal Vascular Procedures - No (0) [Ischemic Heart Disease] : Ischemic Heart Disease - No (0) [Congestive Heart Failure] : Congestive Heart Failure - No (0) [Prior Cerebrovascular Accident or TIA] : Prior Cerebrovascular Accident or TIA - No (0) [Creatinine >= 2mg/dL (1 Point)] : Creatinine >= 2mg/dL - No (0) [Insulin-dependent Diabetic (1 Point)] : Insulin-dependent Diabetic - No (0) [0] : 0 , RCRI Class: I, Risk of Post-Op Cardiac Complications: 3.9%, 95% CI for Risk Estimate: 2.8% - 5.4% [Patient Optimized for Surgery] : Patient optimized for surgery [No Further Testing Recommended] : no further testing recommended [Continue medications as is] : Continue current medications [As per surgery] : as per surgery [FreeTextEntry4] : ROBBY HUBER  is a 57 year old male  with history of HTN, T2DM, lung nodule s/p RVATS and RLL wedge resection and found to have cryptococcus,  intracranial hemangioma, asthma, hypertriglyceridemia, bilateral adrenal adenomas, history of elevated 24-hour urine dopamine level, kidney stones, elevated transaminase levels presented today for preop medical clearance for Rt CTS release. \par Pt is low risk candidate for low risk procedure with no further w/up required prior to planned surgery and no contraindication to proceeding with planned surgery. \par \par  [FreeTextEntry7] : Do not take Aspirin, NSAID( Motrin, Ibuprofen etc.), Herb, supplement 7 days prior to surgery. Hold Metformin 48 hours prior to surgery. Can resume metformin the day after surgery. Continue take amlodipine with small sips of water in the morning of surgery.

## 2022-12-16 NOTE — PHYSICAL EXAM
[de-identified] : WDWN in NAD\par HEENT:  unremarkable\par Neck:  supple, no JVD, no LN\par Lungs:  CTA B/L, no W/R/R\par Heart:  Reg rate, +S1S2, no M/R/G\par Abdomen:  soft, NT, ND, +BS, no masses, no HS-megaly\par Genital: No pubic or genital lesions noted.\par Ext:  no C/C/E\par Neuro:  no focal deficits [de-identified] : b/l Tinel  and phalen test positive. numbness and weakness of b/l hand, fingers.

## 2022-12-16 NOTE — HISTORY OF PRESENT ILLNESS
[No Pertinent Cardiac History] : no history of aortic stenosis, atrial fibrillation, coronary artery disease, recent myocardial infarction, or implantable device/pacemaker [No Adverse Anesthesia Reaction] : no adverse anesthesia reaction in self or family member [(Patient denies any chest pain, claudication, dyspnea on exertion, orthopnea, palpitations or syncope)] : Patient denies any chest pain, claudication, dyspnea on exertion, orthopnea, palpitations or syncope [Moderate (4-6 METs)] : Moderate (4-6 METs) [Diabetes] : diabetes [Asthma] : asthma [COPD] : no COPD [Sleep Apnea] : no sleep apnea [Smoker] : not a smoker [Family Member] : no family member with adverse anesthesia reaction/sudden death [Self] : no previous adverse anesthesia reaction [Chronic Anticoagulation] : no chronic anticoagulation [Chronic Kidney Disease] : no chronic kidney disease [FreeTextEntry1] : Rt Carpal Tunnel release [FreeTextEntry3] : [facility] tel) [phone], fax) [phone], surgical coordinator Teresita [FreeTextEntry2] : 12/23/22 [FreeTextEntry4] : ROBBY HUBER  is a 57 year old male  with history of HTN, T2DM, lung nodule s/p RVATS and RLL wedge resection and found to have cryptococcus,  intracranial hemangioma, asthma, hypertriglyceridemia, bilateral adrenal adenomas, history of elevated 24-hour urine dopamine level, kidney stones, elevated transaminase levels presented today for preop medical clearance for Rt CTS release. \par Been suffering from numbing, burning pain and weakness of b/l wrist due to CTS and multiple wrist brace, NSAID, PT did not help.\par He enjoys [employer] playing and his CTS affected his quality of life.\par He's lost 15 lbs in recent year by diet management. Been off ozempic injection.\par Denied fever, chills,CP, SOB, abdominal pain, n/v/c/d. [FreeTextEntry5] : former smoker, Quit smoking in 2006 .Used to smoke a half pack a day of cigarette.

## 2022-12-20 ENCOUNTER — RX RENEWAL (OUTPATIENT)
Age: 57
End: 2022-12-20

## 2022-12-22 ENCOUNTER — TRANSCRIPTION ENCOUNTER (OUTPATIENT)
Age: 57
End: 2022-12-22
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
## 2022-12-23 ENCOUNTER — OUTPATIENT (OUTPATIENT)
Dept: OUTPATIENT SERVICES | Facility: HOSPITAL | Age: 57
LOS: 1 days | Discharge: HOME | End: 2022-12-23

## 2022-12-23 ENCOUNTER — TRANSCRIPTION ENCOUNTER (OUTPATIENT)
Age: 57
End: 2022-12-23

## 2022-12-23 VITALS
HEART RATE: 75 BPM | TEMPERATURE: 98 F | DIASTOLIC BLOOD PRESSURE: 66 MMHG | SYSTOLIC BLOOD PRESSURE: 119 MMHG | RESPIRATION RATE: 16 BRPM | OXYGEN SATURATION: 98 %

## 2022-12-23 VITALS
SYSTOLIC BLOOD PRESSURE: 133 MMHG | HEIGHT: 70 IN | WEIGHT: 223.11 LBS | HEART RATE: 72 BPM | DIASTOLIC BLOOD PRESSURE: 67 MMHG | RESPIRATION RATE: 16 BRPM | TEMPERATURE: 98 F | OXYGEN SATURATION: 96 %

## 2022-12-23 DIAGNOSIS — Z98.890 OTHER SPECIFIED POSTPROCEDURAL STATES: Chronic | ICD-10-CM

## 2022-12-23 DIAGNOSIS — Z41.9 ENCOUNTER FOR PROCEDURE FOR PURPOSES OTHER THAN REMEDYING HEALTH STATE, UNSPECIFIED: Chronic | ICD-10-CM

## 2022-12-23 DIAGNOSIS — G56.01 CARPAL TUNNEL SYNDROME, RIGHT UPPER LIMB: ICD-10-CM

## 2022-12-23 DEVICE — SURGIFOAM PAD 8CM X 12.5CM X 2MM (100C): Type: IMPLANTABLE DEVICE | Status: FUNCTIONAL

## 2022-12-23 DEVICE — BONE WAX 2.5GM: Type: IMPLANTABLE DEVICE | Status: FUNCTIONAL

## 2022-12-23 RX ORDER — ACETAMINOPHEN WITH CODEINE 300MG-30MG
1 TABLET ORAL
Qty: 20 | Refills: 0
Start: 2022-12-23 | End: 2022-12-27

## 2022-12-23 RX ORDER — ONDANSETRON 8 MG/1
4 TABLET, FILM COATED ORAL ONCE
Refills: 0 | Status: DISCONTINUED | OUTPATIENT
Start: 2022-12-23 | End: 2023-01-06

## 2022-12-23 RX ORDER — ACETAMINOPHEN 500 MG
650 TABLET ORAL EVERY 6 HOURS
Refills: 0 | Status: DISCONTINUED | OUTPATIENT
Start: 2022-12-23 | End: 2023-01-06

## 2022-12-23 RX ORDER — FENTANYL CITRATE 50 UG/ML
25 INJECTION INTRAVENOUS
Refills: 0 | Status: DISCONTINUED | OUTPATIENT
Start: 2022-12-23 | End: 2022-12-23

## 2022-12-23 RX ORDER — FENTANYL CITRATE 50 UG/ML
50 INJECTION INTRAVENOUS
Refills: 0 | Status: DISCONTINUED | OUTPATIENT
Start: 2022-12-23 | End: 2022-12-23

## 2022-12-23 NOTE — ASU DISCHARGE PLAN (ADULT/PEDIATRIC) - ASU DC SPECIAL INSTRUCTIONSFT
Patient underwent a RIGHT endoscopic assisted carpal tunnel release. Incision was closed with NON-absorbable sutures that will need to be removed in the office in 10-14 days. Keep ace wrap on for 2-3 days and keep arm elevated as much as possible, avoid heavy lifting or overuse of Right arm/hand. Ok to remove dressing under ace wrap in 3-5 days. No showering for 3 days (keep wound dry for 3 days). Postoperative pain medication and antibiotics have been sent to the patient's pharmacy. Please call Dr. Goodman's office for an appointment for 10-14 days from discharge.

## 2022-12-27 LAB — GLUCOSE BLDC GLUCOMTR-MCNC: 112 MG/DL — HIGH (ref 70–99)

## 2023-01-13 ENCOUNTER — APPOINTMENT (OUTPATIENT)
Dept: INTERNAL MEDICINE | Facility: CLINIC | Age: 58
End: 2023-01-13
Payer: COMMERCIAL

## 2023-01-13 VITALS
HEART RATE: 74 BPM | DIASTOLIC BLOOD PRESSURE: 70 MMHG | OXYGEN SATURATION: 96 % | HEIGHT: 69 IN | BODY MASS INDEX: 32.58 KG/M2 | WEIGHT: 220 LBS | SYSTOLIC BLOOD PRESSURE: 128 MMHG

## 2023-01-13 DIAGNOSIS — Z23 ENCOUNTER FOR IMMUNIZATION: ICD-10-CM

## 2023-01-13 DIAGNOSIS — Z00.00 ENCOUNTER FOR GENERAL ADULT MEDICAL EXAMINATION W/OUT ABNORMAL FINDINGS: ICD-10-CM

## 2023-01-13 PROCEDURE — G0009: CPT

## 2023-01-13 PROCEDURE — 99396 PREV VISIT EST AGE 40-64: CPT | Mod: 25

## 2023-01-13 PROCEDURE — 90677 PCV20 VACCINE IM: CPT

## 2023-01-13 PROCEDURE — 90472 IMMUNIZATION ADMIN EACH ADD: CPT

## 2023-01-13 PROCEDURE — 90686 IIV4 VACC NO PRSV 0.5 ML IM: CPT

## 2023-01-15 NOTE — ASSESSMENT
[FreeTextEntry1] : \par # Health maintenance\par -Colonoscopy: unable to have repeat colon screen next year bc when GI tried to get approval, declined.  Now at 7 years out after last with one polyp, ?resubmit - will d/w Dr. Maldonado\par -COVID vaccine: UTD, need dates for chart; encouraged bivalent vacc\par -Flu vaccine: accepted, given\par -Tdap vaccine:  Due, wants next visit\par -Pneumovax:  explained indicate w DM, he accepts - given\par -Shingrix: will discuss further\par \par Full labs ordered.  PSA discussion had, he wants to engage after understanding all pros/cons - included in labs. \par \par DM - A1C due, micralb due in July - neg at endo.  Filament exam wnl, reminded retinal exam annually - did have in summer.  Now on metformin, lost 15 pounds from peak 1-2 years ago; congratulated, encouraged his exercise, less carb/calories at the plate.  Appropriately on statin.  Came off ozempic, was hard to get, and he saw his A1Cs were very well controlled without it.  \par \par HTN - well controlled w weight loss and with low dose CCB.  \par \par f/u 6 mos

## 2023-01-15 NOTE — HISTORY OF PRESENT ILLNESS
[FreeTextEntry1] : CPE [de-identified] : Here for annual and to f/u HTN, DM, lung nodule s/p RVATS and RLL wedge resection and found to have cryptococcus s/p rx, adrenal nodule w negative work up for hormonal activty, intracranial hemangioma, asthma, hypertriglyceridemia .  Lung surgery was in Aug 2020. Seeing pulm, also sees endo for his DM, although not in some time - told him if he's been busy for those visits, we can consolidate asnd run here.\par \par Deals w psoriasis: on/off in hands, elbows, ankles, knees and applying regular moisturizer.\par \par Exercise: mirror exercise and goes to gym regularly\par Diet: regular diet. snacking with fruit, cookies, chips. 2-3 diet. \par Smoking: marijuana occasionally, smoked cigarette at age 20 to 30s. He stopped on wedding day at age 28. Restarted and completely stopped 15 years go.\par recreational drug: no. marijuana occasionally\par drinking: once a month, 1-2 drinks.\par

## 2023-01-15 NOTE — HEALTH RISK ASSESSMENT
[Very Good] : ~his/her~  mood as very good [Former] : Former [5-9] : 5-9 [Never (0 pts)] : Never (0 points) [No] : In the past 12 months have you used drugs other than those required for medical reasons? No [No falls in past year] : Patient reported no falls in the past year [0] : 2) Feeling down, depressed, or hopeless: Not at all (0) [With Family] : lives with family [Employed] : employed [College] : College [] :  [# Of Children ___] : has [unfilled] children [Feels Safe at Home] : Feels safe at home [Fully functional (bathing, dressing, toileting, transferring, walking, feeding)] : Fully functional (bathing, dressing, toileting, transferring, walking, feeding) [Fully functional (using the telephone, shopping, preparing meals, housekeeping, doing laundry, using] : Fully functional and needs no help or supervision to perform IADLs (using the telephone, shopping, preparing meals, housekeeping, doing laundry, using transportation, managing medications and managing finances) [Reports normal functional visual acuity (ie: able to read med bottle)] : Reports normal functional visual acuity [Smoke Detector] : smoke detector [Carbon Monoxide Detector] : carbon monoxide detector [Safety elements used in home] : safety elements used in home [Seat Belt] :  uses seat belt [Sunscreen] : uses sunscreen [de-identified] : 1 [YearQuit] : 1993 [Audit-CScore] : 0 [QQD7Lnpak] : 0 [High Risk Behavior] : no high risk behavior [Reports changes in hearing] : Reports no changes in hearing [Reports changes in vision] : Reports no changes in vision [Reports changes in dental health] : Reports no changes in dental health [Guns at Home] : no guns at home [Travel to Developing Areas] : does not  travel to developing areas [TB Exposure] : is not being exposed to tuberculosis [Caregiver Concerns] : does not have caregiver concerns [ColonoscopyDate] : 07/16 [ColonoscopyComments] : recalls one polyp (Dr. GLADYS Fonseca) [HepatitisCDate] : 12/18

## 2023-01-15 NOTE — COUNSELING
[Sleep ___ hours/day] : Sleep [unfilled] hours/day [Engage in a relaxing activity] : Engage in a relaxing activity [AUDIT-C Screening administered and reviewed] : AUDIT-C Screening administered and reviewed [Benefits of weight loss discussed] : Benefits of weight loss discussed [Encouraged to increase physical activity] : Encouraged to increase physical activity [Good understanding] : Patient has a good understanding of disease, goals and obesity follow-up plan [None] : None

## 2023-01-15 NOTE — PHYSICAL EXAM
[No Acute Distress] : no acute distress [Well Nourished] : well nourished [Well Developed] : well developed [Well-Appearing] : well-appearing [Normal Sclera/Conjunctiva] : normal sclera/conjunctiva [PERRL] : pupils equal round and reactive to light [EOMI] : extraocular movements intact [Normal Outer Ear/Nose] : the outer ears and nose were normal in appearance [Normal Oropharynx] : the oropharynx was normal [Normal TMs] : both tympanic membranes were normal [No JVD] : no jugular venous distention [No Lymphadenopathy] : no lymphadenopathy [Supple] : supple [Thyroid Normal, No Nodules] : the thyroid was normal and there were no nodules present [No Respiratory Distress] : no respiratory distress  [No Accessory Muscle Use] : no accessory muscle use [Clear to Auscultation] : lungs were clear to auscultation bilaterally [Normal Rate] : normal rate  [Regular Rhythm] : with a regular rhythm [Normal S1, S2] : normal S1 and S2 [No Murmur] : no murmur heard [No Carotid Bruits] : no carotid bruits [No Abdominal Bruit] : a ~M bruit was not heard ~T in the abdomen [Pedal Pulses Present] : the pedal pulses are present [No Edema] : there was no peripheral edema [Soft] : abdomen soft [Non Tender] : non-tender [Non-distended] : non-distended [No Masses] : no abdominal mass palpated [No HSM] : no HSM [Normal Bowel Sounds] : normal bowel sounds [Normal Supraclavicular Nodes] : no supraclavicular lymphadenopathy [Normal Posterior Cervical Nodes] : no posterior cervical lymphadenopathy [Normal Anterior Cervical Nodes] : no anterior cervical lymphadenopathy [No CVA Tenderness] : no CVA  tenderness [No Spinal Tenderness] : no spinal tenderness [No Joint Swelling] : no joint swelling [Grossly Normal Strength/Tone] : grossly normal strength/tone [No Rash] : no rash [No Skin Lesions] : no skin lesions [Coordination Grossly Intact] : coordination grossly intact [No Focal Deficits] : no focal deficits [Normal Gait] : normal gait [Deep Tendon Reflexes (DTR)] : deep tendon reflexes were 2+ and symmetric [Speech Grossly Normal] : speech grossly normal [Memory Grossly Normal] : memory grossly normal [Normal Affect] : the affect was normal [Alert and Oriented x3] : oriented to person, place, and time [Normal Mood] : the mood was normal [Normal Insight/Judgement] : insight and judgment were intact [] : both feet [de-identified] : b/l hands joint numbness ad burning sense. No weakness. normal ROM [de-identified] : no suspicious nevi [TWNoteComboBox3] : +2 [TWNoteComboBox4] : +2

## 2023-02-02 NOTE — REVIEW OF SYSTEMS
Yes [Fatigue] : fatigue [Palpitations] : palpitations [Diarrhea] : diarrhea [Skin Rash] : skin rash [Anxiety] : anxiety [Negative] : Musculoskeletal [Fever] : no fever [Chills] : no chills [Chest Pain] : no chest pain [Shortness Of Breath] : no shortness of breath [Cough] : no cough [Dyspnea on Exertion] : not dyspnea on exertion [Abdominal Pain] : no abdominal pain [Nausea] : no nausea [Constipation] : no constipation [Dysuria] : no dysuria [Vomiting] : no vomiting [Frequency] : no frequency [Dizziness] : no dizziness [Fainting] : no fainting [Easy Bleeding] : no easy bleeding [Swollen Glands] : no swollen glands [Easy Bruising] : no easy bruising [FreeTextEntry2] : fatigue due to decreased sleep [FreeTextEntry4] : had soreness with swallowing, now better today than yesterday [de-identified] : rash around anal area (resolves with cream) [FreeTextEntry5] : palpitations possibly related to anxiety, no associated dizziness/lightheadedness

## 2023-02-09 ENCOUNTER — OUTPATIENT (OUTPATIENT)
Dept: OUTPATIENT SERVICES | Facility: HOSPITAL | Age: 58
LOS: 1 days | End: 2023-02-09

## 2023-02-09 VITALS
TEMPERATURE: 97 F | SYSTOLIC BLOOD PRESSURE: 120 MMHG | HEIGHT: 69.69 IN | RESPIRATION RATE: 16 BRPM | DIASTOLIC BLOOD PRESSURE: 60 MMHG | HEART RATE: 68 BPM | WEIGHT: 220.46 LBS | OXYGEN SATURATION: 97 %

## 2023-02-09 DIAGNOSIS — Z98.890 OTHER SPECIFIED POSTPROCEDURAL STATES: Chronic | ICD-10-CM

## 2023-02-09 DIAGNOSIS — G56.02 CARPAL TUNNEL SYNDROME, LEFT UPPER LIMB: ICD-10-CM

## 2023-02-09 DIAGNOSIS — Z41.9 ENCOUNTER FOR PROCEDURE FOR PURPOSES OTHER THAN REMEDYING HEALTH STATE, UNSPECIFIED: Chronic | ICD-10-CM

## 2023-02-09 DIAGNOSIS — Z87.81 PERSONAL HISTORY OF (HEALED) TRAUMATIC FRACTURE: Chronic | ICD-10-CM

## 2023-02-09 DIAGNOSIS — E11.9 TYPE 2 DIABETES MELLITUS WITHOUT COMPLICATIONS: ICD-10-CM

## 2023-02-09 DIAGNOSIS — I10 ESSENTIAL (PRIMARY) HYPERTENSION: ICD-10-CM

## 2023-02-09 LAB
HCT VFR BLD CALC: 42.7 % — SIGNIFICANT CHANGE UP (ref 39–50)
HGB BLD-MCNC: 13.6 G/DL — SIGNIFICANT CHANGE UP (ref 13–17)
MCHC RBC-ENTMCNC: 28.5 PG — SIGNIFICANT CHANGE UP (ref 27–34)
MCHC RBC-ENTMCNC: 31.9 GM/DL — LOW (ref 32–36)
MCV RBC AUTO: 89.5 FL — SIGNIFICANT CHANGE UP (ref 80–100)
NRBC # BLD: 0 /100 WBCS — SIGNIFICANT CHANGE UP (ref 0–0)
NRBC # FLD: 0 K/UL — SIGNIFICANT CHANGE UP (ref 0–0)
PLATELET # BLD AUTO: 237 K/UL — SIGNIFICANT CHANGE UP (ref 150–400)
RBC # BLD: 4.77 M/UL — SIGNIFICANT CHANGE UP (ref 4.2–5.8)
RBC # FLD: 13.9 % — SIGNIFICANT CHANGE UP (ref 10.3–14.5)
WBC # BLD: 7.19 K/UL — SIGNIFICANT CHANGE UP (ref 3.8–10.5)
WBC # FLD AUTO: 7.19 K/UL — SIGNIFICANT CHANGE UP (ref 3.8–10.5)

## 2023-02-09 RX ORDER — SODIUM CHLORIDE 9 MG/ML
3 INJECTION INTRAMUSCULAR; INTRAVENOUS; SUBCUTANEOUS EVERY 8 HOURS
Refills: 0 | Status: DISCONTINUED | OUTPATIENT
Start: 2023-03-17 | End: 2023-03-31

## 2023-02-09 RX ORDER — OMEGA-3 ACID ETHYL ESTERS 1 G
1 CAPSULE ORAL
Qty: 0 | Refills: 0 | DISCHARGE

## 2023-02-09 NOTE — H&P PST ADULT - NSICDXPASTMEDICALHX_GEN_ALL_CORE_FT
PAST MEDICAL HISTORY:  Adrenal nodule found incidentally, monitored    Allergic Asthma induced by Cat dandur    Arthritis     Cellulitis     Cervical spondylosis     COVID-19 virus infection positive on 3/22/22    Diabetes mellitus     Esophageal reflux     Fatty liver     fracture Right tibia s/p surgery    H/O carpal tunnel syndrome right    HTN (hypertension)     Labyrinthitis     Lumbar radiculopathy     Lung nodule rll, had cryptococcus infection, treated, s/p surgery    Nephrolithiasis left, monitored    Orbital fracture     Psoriasis     Sinusitis s/p

## 2023-02-09 NOTE — H&P PST ADULT - PAIN SITE
7/13/2017          To Whom It May Concern:    Ney Gold is currently under my medical care. Jamal Romero is being seen for a right ankle injury and is attending physical therapy    Jamal Romero will not be able to work at this time.     Jamal Romero will b
left hand

## 2023-02-09 NOTE — H&P PST ADULT - PROBLEM SELECTOR PLAN 1
Pre-op instructions provided. Pt verbalized understanding.   Pepcid provided for GI prophylaxis.   Order placed for preop COVID PCR testing.   LJ precautions. Pt with >3 criteria on STOP-Bang Questionairre.

## 2023-02-09 NOTE — H&P PST ADULT - HISTORY OF PRESENT ILLNESS
57 year old male with h/o carpal tunnel syndrome s/p right carpal tunnel repair 12/22/23 presents for presurgical evaluation for .... 57 year old male with h/o carpal tunnel syndrome s/p right carpal tunnel release 12/22/23 presents for presurgical evaluation for Left Endoscopic Release of Carpal Tunnel.

## 2023-03-07 ENCOUNTER — OUTPATIENT (OUTPATIENT)
Dept: OUTPATIENT SERVICES | Facility: HOSPITAL | Age: 58
LOS: 1 days | End: 2023-03-07

## 2023-03-07 VITALS
TEMPERATURE: 98 F | OXYGEN SATURATION: 97 % | DIASTOLIC BLOOD PRESSURE: 82 MMHG | SYSTOLIC BLOOD PRESSURE: 143 MMHG | RESPIRATION RATE: 16 BRPM | HEIGHT: 70 IN | WEIGHT: 214.95 LBS | HEART RATE: 81 BPM

## 2023-03-07 DIAGNOSIS — G56.02 CARPAL TUNNEL SYNDROME, LEFT UPPER LIMB: ICD-10-CM

## 2023-03-07 DIAGNOSIS — Z91.89 OTHER SPECIFIED PERSONAL RISK FACTORS, NOT ELSEWHERE CLASSIFIED: ICD-10-CM

## 2023-03-07 DIAGNOSIS — I10 ESSENTIAL (PRIMARY) HYPERTENSION: ICD-10-CM

## 2023-03-07 DIAGNOSIS — Z87.81 PERSONAL HISTORY OF (HEALED) TRAUMATIC FRACTURE: Chronic | ICD-10-CM

## 2023-03-07 DIAGNOSIS — Z98.890 OTHER SPECIFIED POSTPROCEDURAL STATES: Chronic | ICD-10-CM

## 2023-03-07 DIAGNOSIS — E11.9 TYPE 2 DIABETES MELLITUS WITHOUT COMPLICATIONS: ICD-10-CM

## 2023-03-07 DIAGNOSIS — J45.909 UNSPECIFIED ASTHMA, UNCOMPLICATED: ICD-10-CM

## 2023-03-07 LAB
A1C WITH ESTIMATED AVERAGE GLUCOSE RESULT: 5.8 % — HIGH (ref 4–5.6)
ANION GAP SERPL CALC-SCNC: 13 MMOL/L — SIGNIFICANT CHANGE UP (ref 7–14)
BUN SERPL-MCNC: 18 MG/DL — SIGNIFICANT CHANGE UP (ref 7–23)
CALCIUM SERPL-MCNC: 9 MG/DL — SIGNIFICANT CHANGE UP (ref 8.4–10.5)
CHLORIDE SERPL-SCNC: 103 MMOL/L — SIGNIFICANT CHANGE UP (ref 98–107)
CO2 SERPL-SCNC: 25 MMOL/L — SIGNIFICANT CHANGE UP (ref 22–31)
CREAT SERPL-MCNC: 0.92 MG/DL — SIGNIFICANT CHANGE UP (ref 0.5–1.3)
EGFR: 97 ML/MIN/1.73M2 — SIGNIFICANT CHANGE UP
ESTIMATED AVERAGE GLUCOSE: 120 — SIGNIFICANT CHANGE UP
GLUCOSE SERPL-MCNC: 88 MG/DL — SIGNIFICANT CHANGE UP (ref 70–99)
POTASSIUM SERPL-MCNC: 3.9 MMOL/L — SIGNIFICANT CHANGE UP (ref 3.5–5.3)
POTASSIUM SERPL-SCNC: 3.9 MMOL/L — SIGNIFICANT CHANGE UP (ref 3.5–5.3)
SODIUM SERPL-SCNC: 141 MMOL/L — SIGNIFICANT CHANGE UP (ref 135–145)

## 2023-03-07 RX ORDER — SODIUM CHLORIDE 9 MG/ML
1000 INJECTION, SOLUTION INTRAVENOUS
Refills: 0 | Status: DISCONTINUED | OUTPATIENT
Start: 2023-03-17 | End: 2023-03-31

## 2023-03-07 NOTE — H&P PST ADULT - HISTORY OF PRESENT ILLNESS
57 year old male PMH HTN HLD T2DM allergic asthma psoriasis Right VATS 2019 craniofacial reconstruction right tibial fracture with h/o carpal tunnel syndrome s/p right carpal tunnel release 12/22/22 presents for presurgical evaluation for Left Endoscopic Release of Carpal Tunnel.

## 2023-03-07 NOTE — H&P PST ADULT - NSICDXPASTSURGICALHX_GEN_ALL_CORE_FT
PAST SURGICAL HISTORY:  H/O carpal tunnel repair     H/O foot surgery     History of lung surgery Right VATS RLL wedge resection 2019    S/P craniofacial reconstruction     S/p tibial fracture

## 2023-03-07 NOTE — H&P PST ADULT - PROBLEM SELECTOR PLAN 1
Patient tentatively scheduled for Left Endoscopic Release of Carpal Tunnel for 3/17/23. Pre-op instructions provided. Pt given verbal and written instructions with teach back on chlorhexidine shampoo and pepcid. Pt verbalized understanding with return demonstration.     CBC in chart from 2/9/23. EKG in chart from 12/2022. BMP A1C done.  No further evaluations requested.

## 2023-03-07 NOTE — H&P PST ADULT - NSICDXPASTMEDICALHX_GEN_ALL_CORE_FT
PAST MEDICAL HISTORY:  Adrenal nodule found incidentally, monitored    Allergic Asthma induced by Cat dandur    Arthritis     Cellulitis     Cervical spondylosis     COVID-19 virus infection positive on 3/22/22    Diabetes mellitus     Esophageal reflux     Fatty liver     fracture Right tibia s/p surgery    H/O carpal tunnel syndrome     HLD (hyperlipidemia)     HTN (hypertension)     Labyrinthitis     Lumbar radiculopathy     Lung nodule rll, had cryptococcus infection, treated, s/p surgery    Nephrolithiasis left, monitored    Orbital fracture     Psoriasis     Sinusitis s/p

## 2023-03-08 PROBLEM — S02.85XA FRACTURE OF ORBIT, UNSPECIFIED, INITIAL ENCOUNTER FOR CLOSED FRACTURE: Chronic | Status: ACTIVE | Noted: 2023-02-09

## 2023-03-16 ENCOUNTER — TRANSCRIPTION ENCOUNTER (OUTPATIENT)
Age: 58
End: 2023-03-16

## 2023-03-16 NOTE — ASU PATIENT PROFILE, ADULT - FALL HARM RISK - UNIVERSAL INTERVENTIONS
Bed in lowest position, wheels locked, appropriate side rails in place/Call bell, personal items and telephone in reach/Instruct patient to call for assistance before getting out of bed or chair/Non-slip footwear when patient is out of bed/[place] to call system/Physically safe environment - no spills, clutter or unnecessary equipment/Purposeful Proactive Rounding/Room/bathroom lighting operational, light cord in reach

## 2023-03-17 ENCOUNTER — TRANSCRIPTION ENCOUNTER (OUTPATIENT)
Age: 58
End: 2023-03-17

## 2023-03-17 ENCOUNTER — OUTPATIENT (OUTPATIENT)
Dept: OUTPATIENT SERVICES | Facility: HOSPITAL | Age: 58
LOS: 1 days | Discharge: HOME | End: 2023-03-17

## 2023-03-17 VITALS
OXYGEN SATURATION: 97 % | HEART RATE: 70 BPM | RESPIRATION RATE: 16 BRPM | WEIGHT: 220.02 LBS | HEIGHT: 70 IN | SYSTOLIC BLOOD PRESSURE: 137 MMHG | TEMPERATURE: 98 F | DIASTOLIC BLOOD PRESSURE: 71 MMHG

## 2023-03-17 VITALS
RESPIRATION RATE: 16 BRPM | TEMPERATURE: 98 F | OXYGEN SATURATION: 96 % | HEART RATE: 66 BPM | SYSTOLIC BLOOD PRESSURE: 127 MMHG | DIASTOLIC BLOOD PRESSURE: 69 MMHG

## 2023-03-17 DIAGNOSIS — Z98.890 OTHER SPECIFIED POSTPROCEDURAL STATES: Chronic | ICD-10-CM

## 2023-03-17 DIAGNOSIS — G56.02 CARPAL TUNNEL SYNDROME, LEFT UPPER LIMB: ICD-10-CM

## 2023-03-17 DIAGNOSIS — Z87.81 PERSONAL HISTORY OF (HEALED) TRAUMATIC FRACTURE: Chronic | ICD-10-CM

## 2023-03-17 LAB
GLUCOSE BLDC GLUCOMTR-MCNC: 117 MG/DL — HIGH (ref 70–99)
GLUCOSE BLDC GLUCOMTR-MCNC: 162 MG/DL — HIGH (ref 70–99)

## 2023-03-17 DEVICE — SURGIFLO MATRIX WITH THROMBIN KIT: Type: IMPLANTABLE DEVICE | Status: FUNCTIONAL

## 2023-03-17 RX ORDER — ALBUTEROL 90 UG/1
2 AEROSOL, METERED ORAL
Qty: 0 | Refills: 0 | DISCHARGE

## 2023-03-17 RX ORDER — ROSUVASTATIN CALCIUM 5 MG/1
1 TABLET ORAL
Qty: 0 | Refills: 0 | DISCHARGE

## 2023-03-17 RX ORDER — METFORMIN HYDROCHLORIDE 850 MG/1
1 TABLET ORAL
Qty: 0 | Refills: 0 | DISCHARGE

## 2023-03-17 RX ORDER — FENTANYL CITRATE 50 UG/ML
50 INJECTION INTRAVENOUS
Refills: 0 | Status: DISCONTINUED | OUTPATIENT
Start: 2023-03-17 | End: 2023-03-17

## 2023-03-17 RX ORDER — ONDANSETRON 8 MG/1
4 TABLET, FILM COATED ORAL ONCE
Refills: 0 | Status: DISCONTINUED | OUTPATIENT
Start: 2023-03-17 | End: 2023-03-31

## 2023-03-17 RX ORDER — FENTANYL CITRATE 50 UG/ML
25 INJECTION INTRAVENOUS
Refills: 0 | Status: DISCONTINUED | OUTPATIENT
Start: 2023-03-17 | End: 2023-03-17

## 2023-03-17 RX ORDER — AMLODIPINE BESYLATE 2.5 MG/1
7.5 TABLET ORAL
Qty: 0 | Refills: 0 | DISCHARGE

## 2023-03-17 RX ORDER — OXYCODONE HYDROCHLORIDE 5 MG/1
5 TABLET ORAL ONCE
Refills: 0 | Status: DISCONTINUED | OUTPATIENT
Start: 2023-03-17 | End: 2023-03-17

## 2023-03-17 NOTE — ASU DISCHARGE PLAN (ADULT/PEDIATRIC) - CALL YOUR DOCTOR IF YOU HAVE ANY OF THE FOLLOWING:
Bleeding that does not stop/Swelling that gets worse/Wound/Surgical Site with redness, or foul smelling discharge or pus/Numbness, tingling, color or temperature change to extremity Bleeding that does not stop/Swelling that gets worse/Pain not relieved by Medications/Fever greater than (need to indicate Fahrenheit or Celsius)/Wound/Surgical Site with redness, or foul smelling discharge or pus/Numbness, tingling, color or temperature change to extremity/Nausea and vomiting that does not stop/Inability to tolerate liquids or foods

## 2023-03-17 NOTE — ASU DISCHARGE PLAN (ADULT/PEDIATRIC) - ASU DC SPECIAL INSTRUCTIONSFT
Keep dressing on and dry for 5 days, remove after 5 days. Maintain normal range of motion but no heavy lifting/straining in the L hand. Once dressing is removed do not scrub incision, allow water to wash over the incision. Do not submerge in water for 14 days. Do not remove steri-strips they will fall off. Call the office with any redness, burning, drainage, wound opening, worsening pain, redness, fevers. Take OTC tylenol as needed for pain. Follow-up in the office as scheduled.

## 2023-03-17 NOTE — ASU DISCHARGE PLAN (ADULT/PEDIATRIC) - CARE PROVIDER_API CALL
Joao Goodman)  Neurosurgery; Pediatric Neurosurgery  [address] [place] [address], [address]  [place], [place] [id number]  Phone: [phone]  Fax: [phone]  Established Patient  Follow Up Time: 1 week

## 2023-03-17 NOTE — ASU DISCHARGE PLAN (ADULT/PEDIATRIC) - FOLLOW UP APPOINTMENTS
May also call Recovery Room (PACU) 24/7 @ [phone]/[facility]

## 2023-03-17 NOTE — ASU DISCHARGE PLAN (ADULT/PEDIATRIC) - NURSING INSTRUCTIONS
You were given 1000mg IV Tylenol for pain management.  Please DO NOT take any Tylenol containing products, such as  Vicodin, Percocet, Excedrin, many cold preparations for the next 6 hours (until  _8__ PM).  DO NOT EXCEED 4000MG OF TYLENOL OVER 24 HOURS.

## 2023-06-28 LAB
ALBUMIN SERPL ELPH-MCNC: 4.4 G/DL
ALP BLD-CCNC: 86 U/L
ALT SERPL-CCNC: 85 U/L
ANION GAP SERPL CALC-SCNC: 10 MMOL/L
AST SERPL-CCNC: 41 U/L
BILIRUB SERPL-MCNC: 0.4 MG/DL
BUN SERPL-MCNC: 11 MG/DL
CALCIUM SERPL-MCNC: 9.1 MG/DL
CHLORIDE SERPL-SCNC: 104 MMOL/L
CHOLEST SERPL-MCNC: 86 MG/DL
CO2 SERPL-SCNC: 27 MMOL/L
CREAT SERPL-MCNC: 0.92 MG/DL
EGFR: 97 ML/MIN/1.73M2
ESTIMATED AVERAGE GLUCOSE: 120 MG/DL
GLUCOSE SERPL-MCNC: 112 MG/DL
HBA1C MFR BLD HPLC: 5.8 %
HDLC SERPL-MCNC: 44 MG/DL
LDLC SERPL CALC-MCNC: 23 MG/DL
NONHDLC SERPL-MCNC: 41 MG/DL
POTASSIUM SERPL-SCNC: 4.5 MMOL/L
PROT SERPL-MCNC: 6.6 G/DL
PSA SERPL-MCNC: 0.51 NG/ML
SODIUM SERPL-SCNC: 141 MMOL/L
TRIGL SERPL-MCNC: 89 MG/DL

## 2023-07-05 PROBLEM — Z86.69 PERSONAL HISTORY OF OTHER DISEASES OF THE NERVOUS SYSTEM AND SENSE ORGANS: Chronic | Status: ACTIVE | Noted: 2023-03-07

## 2023-07-05 PROBLEM — E78.5 HYPERLIPIDEMIA, UNSPECIFIED: Chronic | Status: ACTIVE | Noted: 2023-03-07

## 2023-07-11 ENCOUNTER — NON-APPOINTMENT (OUTPATIENT)
Age: 58
End: 2023-07-11

## 2023-07-11 ENCOUNTER — APPOINTMENT (OUTPATIENT)
Dept: CARDIOLOGY | Facility: CLINIC | Age: 58
End: 2023-07-11
Payer: COMMERCIAL

## 2023-07-11 VITALS
WEIGHT: 231 LBS | HEART RATE: 77 BPM | DIASTOLIC BLOOD PRESSURE: 82 MMHG | OXYGEN SATURATION: 96 % | SYSTOLIC BLOOD PRESSURE: 148 MMHG | BODY MASS INDEX: 34.11 KG/M2

## 2023-07-11 VITALS — DIASTOLIC BLOOD PRESSURE: 70 MMHG | SYSTOLIC BLOOD PRESSURE: 130 MMHG

## 2023-07-11 DIAGNOSIS — E78.5 HYPERLIPIDEMIA, UNSPECIFIED: ICD-10-CM

## 2023-07-11 DIAGNOSIS — E66.9 OBESITY, UNSPECIFIED: ICD-10-CM

## 2023-07-11 DIAGNOSIS — R06.09 OTHER FORMS OF DYSPNEA: ICD-10-CM

## 2023-07-11 PROCEDURE — 99204 OFFICE O/P NEW MOD 45 MIN: CPT | Mod: 25

## 2023-07-11 PROCEDURE — 93000 ELECTROCARDIOGRAM COMPLETE: CPT

## 2023-07-19 ENCOUNTER — APPOINTMENT (OUTPATIENT)
Dept: INTERNAL MEDICINE | Facility: CLINIC | Age: 58
End: 2023-07-19
Payer: COMMERCIAL

## 2023-07-19 VITALS
SYSTOLIC BLOOD PRESSURE: 138 MMHG | BODY MASS INDEX: 34.42 KG/M2 | DIASTOLIC BLOOD PRESSURE: 74 MMHG | OXYGEN SATURATION: 97 % | WEIGHT: 232.4 LBS | HEIGHT: 69 IN | HEART RATE: 78 BPM

## 2023-07-19 DIAGNOSIS — K76.0 FATTY (CHANGE OF) LIVER, NOT ELSEWHERE CLASSIFIED: ICD-10-CM

## 2023-07-19 DIAGNOSIS — E11.9 TYPE 2 DIABETES MELLITUS W/OUT COMPLICATIONS: ICD-10-CM

## 2023-07-19 DIAGNOSIS — Q28.3 OTHER MALFORMATIONS OF CEREBRAL VESSELS: ICD-10-CM

## 2023-07-19 DIAGNOSIS — I10 ESSENTIAL (PRIMARY) HYPERTENSION: ICD-10-CM

## 2023-07-19 LAB — HBA1C MFR BLD HPLC: 6

## 2023-07-19 PROCEDURE — 83036 HEMOGLOBIN GLYCOSYLATED A1C: CPT | Mod: QW

## 2023-07-19 PROCEDURE — 99214 OFFICE O/P EST MOD 30 MIN: CPT | Mod: 25

## 2023-07-19 NOTE — ASSESSMENT
[FreeTextEntry1] : 1) had covid vacc x 3, need dates.  Encouraged bivalent.  Had flu vacc past season.  2) HTN - in good control, on amlodipine 7.5.  3) to have stress echo for GARZA.  ?deconditioning ultimately; pushed his exercise/wt loss efforts.  4) DM - poc 6.0 today, excellent on metformin, ?consider GLP1 to help w wt loss and if any card dz found.  5) cpe 6 mos.  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

## 2023-07-19 NOTE — PHYSICAL EXAM
[No Acute Distress] : no acute distress [Well Nourished] : well nourished [Well Developed] : well developed [Well-Appearing] : well-appearing [No JVD] : no jugular venous distention [Supple] : supple [No Respiratory Distress] : no respiratory distress  [No Accessory Muscle Use] : no accessory muscle use [Clear to Auscultation] : lungs were clear to auscultation bilaterally [Normal Rate] : normal rate  [Regular Rhythm] : with a regular rhythm [Normal S1, S2] : normal S1 and S2 [No Murmur] : no murmur heard [No Carotid Bruits] : no carotid bruits [No Edema] : there was no peripheral edema [No Extremity Clubbing/Cyanosis] : no extremity clubbing/cyanosis [Soft] : abdomen soft [No HSM] : no HSM

## 2023-07-19 NOTE — HISTORY OF PRESENT ILLNESS
[FreeTextEntry1] : Here for f/u of DM, likely fatty liver, weight, GARZA [de-identified] : Saw Dr. Martinez - to have stress echo in work up of GARZA, scheduled.  Feels all right overall.  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  Has no polyuria, polydipsia, no cp.  GARZA is stable/mild.  Lost weight, but put a few back on.

## 2023-08-14 ENCOUNTER — APPOINTMENT (OUTPATIENT)
Dept: CARDIOLOGY | Facility: CLINIC | Age: 58
End: 2023-08-14
Payer: COMMERCIAL

## 2023-08-14 PROCEDURE — 93306 TTE W/DOPPLER COMPLETE: CPT

## 2023-08-17 NOTE — ED PROVIDER NOTE - NS ED MD DISPO DISCHARGE CCDA
Physical Therapy Evaluation    Visit Type: Initial Evaluation -  Daily Treatment Note  Visit: 1  Referring Provider: Roman Dreyer, MD  Medical Diagnosis (from order): Diagnosis Information    Diagnosis  847.2 (ICD-9-CM) - S39.012A (ICD-10-CM) - Strain of lumbar region, initial encounter       Treatment Diagnosis: lumbar - increased pain/symptoms, impaired strength, impaired range of motion, impaired muscle length/flexibility, impaired tissue mobility, impaired joint play/mobility, impaired mobility, impaired activity tolerance and impaired posture.  Onset  - Date of onset: 8/10/2023  Chart reviewed at time of initial evaluation (relevant co-morbidities, allergies, tests and medications listed):   - Diagnostic tests reviewed: X-Ray  Medications, past medical history, and problem list were reviewed by the physical therapist for relevant conditions that may impact physical therapy.       SUBJECTIVE                                                                                                               Patient reports low back started 1 week ago. Denies injury, numbness, changes in bowel and bladder. Patient reports pain with prolonged sitting and flexing forward. Patient reports pain in center of his low back without radicular symptoms     Pain / Symptoms  - Pain rating (out of 10): Current: 6   - Location: See narrative for more details  - Quality / Description: ache     - See narrative for more details  - Alleviating Factors: avoiding movement in involved area     - See narrative for more details    Function:   Limitations / Exacerbation Factors:   - Patient reports difficulty and pain with function reported below.  - bed mobility, house/yard work, sitting tasks, standing tasks, bending/squatting/lifting, lifting/carrying, pushing/pulling, squatting/lifting and sitting, low transfer (toilet/couch) and floor transfers  Prior Level of Function: pain free ADLs and IADLs, See narrative for more details    Patient  Goals: decreased pain. See narrative for more details    Prior treatment  - no therapies  - Discharged from hospital, home health, or skilled nursing facility in last 30 days: no  Home Environment   - Patient lives with: significant other  - Assistance available: as needed  - Denies 2 or more falls or an unexplained fall with injury in the last year.  - Feel safe at home / work / school: yes      OBJECTIVE                                                                                                                     Range of Motion (ROM)   (degrees unless noted; active unless noted; norms in ( ); negative=lacking to 0, positive=beyond 0)  WFL: left hip, right hip  Hip:   - Flexion (100-120):      • Left: 100       • Right: 100    - Extension (20-30):      • Left:  35       • Right:  35   Lumbar:    - Flexion (60-80):  80%  pain     - Extension (25):  80%     - Rotation (30-45):        • Left:  75%         • Right:  75%     - Side Bend (25-35):        • Left:  75%         • Right:  75%   Repeated Motion:       - Flexion: worse     - Extension: improved     Strength  (out of 5 unless noted, standard test position unless noted)   5/5: LLE, RLE, except as noted  Hip:    - Flexion:        • Left: 4        • Right: 4    - Extension:        • Left: 4        • Right: 4         Palpation  Left  - Thoracic Paraspinals: no palpable tenderness  - Lumbar Paraspinals: tenderness and trigger point  - Quadratus Lumborum: tenderness  - Gluteus Ed: no palpable tenderness  - Gluteus Medius: no palpable tenderness  Right  - Thoracic Paraspinals: no palpable tenderness  - Lumbar Paraspinals: tenderness and trigger point  - Quadratus Lumborum: tenderness  - Gluteus Ed: no palpable tenderness  - Gluteus Medius: no palpable tenderness  Lumbar  - L1-L2: - Left: no tenderness - Right: no tenderness  - L2-L3: - Left: no tenderness - Right: no tenderness  - L3-L4: - Left: tenderness - Right: tenderness  - L4-L5: - Left:  tenderness - Right: tenderness  - L5-S1: - Left: no tenderness - Right: no tenderness  - SIJ: - Left: no tenderness - Right: no tenderness     Special Tests  Lumbar: Nerve Tests  - Straight Leg Raise:  Left: negative Right: negative  - Slump Test: Left: negative Right: negative  - Lumbar Quadrant Test: Left: negative Right: negative  Sacroiliac Joint:  - SIJ Thigh Thrust:  Left: negative Right: negative  Hip:   - RUDI Test:  Left: negative Right: negative            Outcome/Assessments  Outcome Measures:   OSWESTRY Total Scored: 18  OSWESTRY Total Possible Score: 50  OSWESTRY Score Calculated: 36 %  (0-20% = minimal disability; 20-40% = moderate disability; 40-60% = severe disability; 60-80% = crippled; % = bed bound) see flowsheet for additional documentation        Treatment     Manual Therapy   soft tissue mobilization to the lumbar paraspinals to address pain, decreased active range of motion, decreased passive range of motion    Therapeutic Activity  To facilitate bending lifting twisting, carrying,    Prone press up on arms   supermans 10 reps  Side stepping red band       Skilled input: verbal instruction/cues, tactile instruction/cues, posture correction, facilitation and inhibition    Writer verbally educated and received verbal consent for hand placement, positioning of patient, and techniques to be performed today from patient for clothing adjustments for techniques, therapist position for techniques, modality application and hand placement and palpation for techniques as described above and how they are pertinent to the patient's plan of care.  Home Exercise Program  Patient issued written home exercise program. Patient was offered home exercise program to be given via text message and e-mail with online access providing exercise description, videos, and pictures to assist.  Patient encouraged to hold on any exercises if unsure how to complete correctly and to follow up next visit with further  questions. Patient demonstrated understanding of all exercises and was given all materials necessary to complete it independently.   Prone press up  Prone superman  Side stepping red band      ASSESSMENT                                                                                                          64 year old patient has reported functional limitations listed above impacted by signs and symptoms consistent with treatment diagnosis below.  Treatment Diagnosis:   - Involved: lumbar.  - Symptoms/impairments: increased pain/symptoms, impaired strength, impaired range of motion, impaired muscle length/flexibility, impaired tissue mobility, impaired joint play/mobility, impaired mobility, impaired activity tolerance and impaired posture.    Patient's signs and symptoms are consistent with low back pain secondary to muscle spasm. Patient treated with extension based treatment with immediate reduction in pain. Patient will benefit from increased lumbar extension with posture and strengthening lumbar erectors  And hips.    Prognosis: patient will benefit from skilled therapy  Rehabilitative potential is: excellent.  Predicted patient presentation: Low (stable) - Patient comorbidities and complexities, as defined above, will have little effect on progress for prescribed plan of care.  Education:   - Present and ready to learn: patient  - Results of above outlined education: Verbalizes understanding and Demonstrates understanding    PLAN                                                                                                                         The following skilled interventions to be implemented to achieve goals listed below:  Neuromuscular Re-Education (54159)  Therapeutic Activity (60518)  Therapeutic Exercise (41984)  Manual Therapy (05106)  Activities of Daily Living/Self Care (69724)  Electrical Stimulation Unattended (73981 or )  Heat/Cold (83188)    Frequency / Duration  2 times per week  tapering as patient progresses for 4 weeks for an estimated total of 5 visits    Patient involved in and agreed to plan of care and goals.  Patient given attendance policy at time of initial evaluation. and Attendance policy reviewed with patient.    Suggestions for next session as indicated: Initiate plan of care to address restrictions identified in initial evaluation contributing to functional deficits      Goals  Patient to reduce pain to 0/10 on average.  Reduce muscle tightness to aid in active range of motion return and reduced pain.  Will attain neutral spine postures for sitting and standing.  The above improvements in impairments to assist in obtaining goals listed below  Long Term Goals: to be met by end of plan of care  1. Patient will rotate trunk swiftly and fully to be able to see blind spots and behind car for safe driving.  2. Patient will demonstrate the ability to reach their feet without limitation due to pain or flexibility in order to perform foot hygiene tasks.  3. Patient will be able to lift 20 pounds from floor to waist with proper body mechanics and without limitation due to pain in order to load groceries into their car independently.  4. Patient will tolerate lift and carry of 10# for 100 feet then place it on a chest height shelf with 1 or 2 hands to demonstrate ability to put away a jug of milk at home without limitation due to pain.      Therapy procedure time and total treatment time can be found documented on the Time Entry flowsheet     Patient/Caregiver provided printed discharge information.

## 2023-08-20 NOTE — DISCUSSION/SUMMARY
[EKG obtained to assist in diagnosis and management of assessed problem(s)] : EKG obtained to assist in diagnosis and management of assessed problem(s) [FreeTextEntry1] : In summary, Mr. Olayinka day is a 57-year-old male with multiple risk factors for CAD and a 2-month history of dyspnea on exertion.  His exam shows regular rhythm, borderline blood pressure, a BMI of 32, clear lungs, and a normal cardiac exam.  His EKG is within normal limits.  \par \par The cause of his dyspnea on exertion is unclear, but he needs a work-up.  He will be scheduled for an echo and stress echo.  \par \par He will continue he will continue on his current regimen of amlodipine 2.5, rosuvastatin 10, and metformin thousand\par \par

## 2023-08-20 NOTE — HISTORY OF PRESENT ILLNESS
[FreeTextEntry1] : 57-year-old male being seen in cardiac evaluation because of a 3-month history of dyspnea on exertion.  He has no prior history of heart disease, but has multiple risk factors including hypertension, hypercholesterolemia, and diabetes.  He is overweight, but has been dieting and has lost weight during the time he developed dyspnea on exertion.  He has no shortness of breath at rest and has no chest pressure.  \par \par He gets occasional episodes of palpitations which are sustained rapid heart beating which lasts a minute or 2.  These episodes are unrelated to the shortness of breath.\par \par His most recent blood work shows an LDL cholesterol of 23 and an A1c of 5.8.\par \par He has a history of asthma and saw his pulmonologist who told him his lung function was fine and suggested he have cardiac evaluation.

## 2023-08-29 ENCOUNTER — APPOINTMENT (OUTPATIENT)
Dept: CARDIOLOGY | Facility: CLINIC | Age: 58
End: 2023-08-29
Payer: COMMERCIAL

## 2023-08-29 PROCEDURE — 93351 STRESS TTE COMPLETE: CPT

## 2023-09-25 ENCOUNTER — RX RENEWAL (OUTPATIENT)
Age: 58
End: 2023-09-25

## 2023-11-01 RX ORDER — AMLODIPINE BESYLATE 2.5 MG/1
2.5 TABLET ORAL
Qty: 270 | Refills: 3 | Status: ACTIVE | COMMUNITY
Start: 2021-05-03 | End: 1900-01-01

## 2023-12-26 ENCOUNTER — APPOINTMENT (OUTPATIENT)
Dept: INTERNAL MEDICINE | Facility: CLINIC | Age: 58
End: 2023-12-26
Payer: COMMERCIAL

## 2023-12-26 ENCOUNTER — OUTPATIENT (OUTPATIENT)
Dept: OUTPATIENT SERVICES | Facility: HOSPITAL | Age: 58
LOS: 1 days | End: 2023-12-26
Payer: COMMERCIAL

## 2023-12-26 DIAGNOSIS — Z98.890 OTHER SPECIFIED POSTPROCEDURAL STATES: Chronic | ICD-10-CM

## 2023-12-26 DIAGNOSIS — U07.1 COVID-19: ICD-10-CM

## 2023-12-26 DIAGNOSIS — Z87.81 PERSONAL HISTORY OF (HEALED) TRAUMATIC FRACTURE: Chronic | ICD-10-CM

## 2023-12-26 PROCEDURE — G0463: CPT

## 2023-12-26 PROCEDURE — 99213 OFFICE O/P EST LOW 20 MIN: CPT | Mod: 95

## 2023-12-26 RX ORDER — UBIQUINOL 100 MG
CAPSULE ORAL
Refills: 0 | Status: ACTIVE | COMMUNITY

## 2023-12-26 RX ORDER — SEMAGLUTIDE 1.34 MG/ML
2 INJECTION, SOLUTION SUBCUTANEOUS
Qty: 2 | Refills: 1 | Status: DISCONTINUED | COMMUNITY
Start: 2022-03-22 | End: 2023-12-26

## 2023-12-26 RX ORDER — MULTIVITAMIN
TABLET ORAL
Refills: 0 | Status: ACTIVE | COMMUNITY

## 2023-12-26 RX ORDER — B-COMPLEX WITH VITAMIN C
TABLET ORAL
Refills: 0 | Status: ACTIVE | COMMUNITY

## 2023-12-26 RX ORDER — PSYLLIUM HUSK 0.4 G
CAPSULE ORAL
Refills: 0 | Status: ACTIVE | COMMUNITY

## 2023-12-26 RX ORDER — NIRMATRELVIR AND RITONAVIR 300-100 MG
20 X 150 MG & KIT ORAL
Qty: 1 | Refills: 0 | Status: ACTIVE | COMMUNITY
Start: 2023-12-26 | End: 1900-01-01

## 2023-12-26 NOTE — ASSESSMENT
[FreeTextEntry1] : 57 yo man with h/o as above including HTN, hyperlipidemia, DM, mild intermittent asthma here for telehealth visit for covid infection.  Will give paxlovid given high risk conditions, to hold statin x 8 days, take amlodipine every other day, nl renal function, counseled on side effects and rebound risk, to call back if worsening pulmonary symptoms or shortness of breath.

## 2023-12-26 NOTE — PHYSICAL EXAM
[No Acute Distress] : no acute distress [Well Nourished] : well nourished [Well Developed] : well developed [Well-Appearing] : well-appearing [EOMI] : extraocular movements intact [Supple] : supple [No Respiratory Distress] : no respiratory distress  [No Accessory Muscle Use] : no accessory muscle use [No Rash] : no rash [Normal Affect] : the affect was normal [de-identified] : examined using audiovisual technology

## 2023-12-26 NOTE — HISTORY OF PRESENT ILLNESS
[Home] : at home, [unfilled] , at the time of the visit. [Medical Office: ([facility])___] : at the medical office located in  [Verbal consent obtained from patient] : the patient, [unfilled] [FreeTextEntry8] : 57 yo man with h/o as below here for telehealth visit for covid infection. Yesterday developed stuffy nose/rhinorrhea, this morning a little worse, covid positive.  Nose is stuffy, coughing, no myalgias.   No other active issues.

## 2023-12-27 ENCOUNTER — APPOINTMENT (OUTPATIENT)
Dept: INTERNAL MEDICINE | Facility: CLINIC | Age: 58
End: 2023-12-27

## 2023-12-27 DIAGNOSIS — I10 ESSENTIAL (PRIMARY) HYPERTENSION: ICD-10-CM

## 2023-12-29 NOTE — ED ADULT NURSE NOTE - CINV DISCH MEDS REVIEWED YN
Last CPE: 10/4/2021 w/ Jeanette King  LOV: 11/10/2023 Video Visit w/ Nena Zimmer  NOV: None scheduled  Requesting:     Disp Refills Start End    citalopram (CeleXA) 10 MG tablet 90 tablet 3 11/10/2023 --    Sig: Take one tablet daily with 1 tablet Celexa 20 mg total daily dose 30 mg    Pt should have refills on file at pharmacy. Request denied.      Disp Refills Start End    LORazepam (ATIVAN) 0.5 MG tablet 20 tablet 0 11/10/2023 --    Sig - Route: Take 1 tablet by mouth 2 times daily as needed for Anxiety. - Oral    Medication pended, okay to refill?   D/C by Dr Garza without RN present

## 2024-01-04 DIAGNOSIS — U07.1 COVID-19: ICD-10-CM

## 2024-02-16 ENCOUNTER — RX RENEWAL (OUTPATIENT)
Age: 59
End: 2024-02-16

## 2024-05-13 ENCOUNTER — RX RENEWAL (OUTPATIENT)
Age: 59
End: 2024-05-13

## 2024-05-22 RX ORDER — METFORMIN ER 500 MG 500 MG/1
500 TABLET ORAL
Qty: 90 | Refills: 3 | Status: ACTIVE | COMMUNITY
Start: 2022-05-10 | End: 1900-01-01

## 2024-05-26 ENCOUNTER — RX RENEWAL (OUTPATIENT)
Age: 59
End: 2024-05-26

## 2024-05-26 RX ORDER — ROSUVASTATIN CALCIUM 10 MG/1
10 TABLET, FILM COATED ORAL
Qty: 90 | Refills: 3 | Status: ACTIVE | COMMUNITY
Start: 2022-05-10 | End: 1900-01-01

## 2024-07-05 NOTE — ASU PATIENT PROFILE, ADULT - TEACHING/LEARNING LEARNING PREFERENCES
D/c metformin due to renal function  Continuing on glipizide  Reviewed BS readings which have been on the low side since returning home despite high A1C recently.   He is working on reducing carbs, but has been needing to eat extra carbs at night to offset hypoglycemia  Discussed having snacks that include carb/protein/fat   He will keep tracking and keep me posted on readings.    Lab Results   Component Value Date    HGBA1C 8.5 (H) 06/11/2024     
He resumed the losartan 100 mg daily after hospitalization  His creatinine has been back down near his baseline on most recent repeat  Has f/u with nephrology  
In setting of multifocal pneumonia  and acute chf.   Did not meet criteria for home o2  Has been monitoring sats at home which have been consistently normal    
Wt Readings from Last 3 Encounters:   07/02/24 80.9 kg (178 lb 6.4 oz)   06/13/24 83 kg (183 lb)   03/14/24 87.5 kg (193 lb)       Monitoring daily weights  Acute CHF during hospitalization for multifocal pneuomonia  Hasn't seen cardiology in recent years  Refer back for eval      
audio

## 2024-09-13 NOTE — H&P PST ADULT - RESPIRATORY AND THORAX
Start Vitamin D 1000 units and Vitamin B12 1000 mcg daily.  Continue to monitor your blood sugar once daily before a meal and record results.  Monitor your blood pressure periodically and record results.  Continue to work on healthy diet and exercise.  Follow up pending lab results.  Follow up in 3 months, or sooner if problems or concerns.   Schedule colonoscopy and eye exam.  Follow up as scheduled with cardiology.  
details…

## 2024-09-20 NOTE — HEALTH RISK ASSESSMENT
[Very Good] : ~his/her~  mood as very good [Never (0 pts)] : Never (0 points) [No] : In the past 12 months have you used drugs other than those required for medical reasons? No [No falls in past year] : Patient reported no falls in the past year [0] : 2) Feeling down, depressed, or hopeless: Not at all (0) [Audit-CScore] : 0 [[id number]] : 0 [With Family] : lives with family [Employed] : employed [College] : College [] :  [# Of Children ___] : has [unfilled] children [High Risk Behavior] : no high risk behavior [Feels Safe at Home] : Feels safe at home [Fully functional (bathing, dressing, toileting, transferring, walking, feeding)] : Fully functional (bathing, dressing, toileting, transferring, walking, feeding) [Fully functional (using the telephone, shopping, preparing meals, housekeeping, doing laundry, using] : Fully functional and needs no help or supervision to perform IADLs (using the telephone, shopping, preparing meals, housekeeping, doing laundry, using transportation, managing medications and managing finances) [Reports changes in hearing] : Reports no changes in hearing [Reports changes in vision] : Reports no changes in vision [Reports normal functional visual acuity (ie: able to read med bottle)] : Reports normal functional visual acuity [Reports changes in dental health] : Reports no changes in dental health [Smoke Detector] : smoke detector [Carbon Monoxide Detector] : carbon monoxide detector [Safety elements used in home] : safety elements used in home [Seat Belt] :  uses seat belt [Sunscreen] : uses sunscreen [Travel to Developing Areas] : does not  travel to developing areas [TB Exposure] : is not being exposed to tuberculosis [Caregiver Concerns] : does not have caregiver concerns [ColonoscopyDate] : 07/16 [ColonoscopyComments] : recalls one polyp (Dr. GLADYS Fonseca) [HepatitisCDate] : 12/18

## 2024-09-20 NOTE — PHYSICAL EXAM
[No Acute Distress] : no acute distress [Well Nourished] : well nourished [Well Developed] : well developed [Well-Appearing] : well-appearing [Normal Sclera/Conjunctiva] : normal sclera/conjunctiva [PERRL] : pupils equal round and reactive to light [EOMI] : extraocular movements intact [Normal Outer Ear/Nose] : the outer ears and nose were normal in appearance [Normal Oropharynx] : the oropharynx was normal [Normal TMs] : both tympanic membranes were normal [No JVD] : no jugular venous distention [No Lymphadenopathy] : no lymphadenopathy [Supple] : supple [Thyroid Normal, No Nodules] : the thyroid was normal and there were no nodules present [No Respiratory Distress] : no respiratory distress  [No Accessory Muscle Use] : no accessory muscle use [Clear to Auscultation] : lungs were clear to auscultation bilaterally [Normal Rate] : normal rate  [Regular Rhythm] : with a regular rhythm [Normal S1, S2] : normal S1 and S2 [No Murmur] : no murmur heard [No Carotid Bruits] : no carotid bruits [No Abdominal Bruit] : a ~M bruit was not heard ~T in the abdomen [Pedal Pulses Present] : the pedal pulses are present [No Edema] : there was no peripheral edema [Soft] : abdomen soft [Non Tender] : non-tender [Non-distended] : non-distended [No Masses] : no abdominal mass palpated [No HSM] : no HSM [Normal Bowel Sounds] : normal bowel sounds [Normal Supraclavicular Nodes] : no supraclavicular lymphadenopathy [Normal Posterior Cervical Nodes] : no posterior cervical lymphadenopathy [Normal Anterior Cervical Nodes] : no anterior cervical lymphadenopathy [No CVA Tenderness] : no CVA  tenderness [No Spinal Tenderness] : no spinal tenderness [No Joint Swelling] : no joint swelling [Grossly Normal Strength/Tone] : grossly normal strength/tone [No Rash] : no rash [No Skin Lesions] : no skin lesions [Coordination Grossly Intact] : coordination grossly intact [No Focal Deficits] : no focal deficits [Normal Gait] : normal gait [Deep Tendon Reflexes (DTR)] : deep tendon reflexes were 2+ and symmetric [Speech Grossly Normal] : speech grossly normal [Memory Grossly Normal] : memory grossly normal [Normal Affect] : the affect was normal [Alert and Oriented x3] : oriented to person, place, and time [Normal Mood] : the mood was normal [Normal Insight/Judgement] : insight and judgment were intact [] : both feet [de-identified] : b/l hands joint numbness ad burning sense. No weakness. normal ROM [de-identified] : no suspicious nevi [TWNoteComboBox3] : +2 [TWNoteComboBox4] : +2

## 2024-09-20 NOTE — HEALTH RISK ASSESSMENT
[Very Good] : ~his/her~  mood as very good [Never (0 pts)] : Never (0 points) [No] : In the past 12 months have you used drugs other than those required for medical reasons? No [No falls in past year] : Patient reported no falls in the past year [0] : 2) Feeling down, depressed, or hopeless: Not at all (0) [Audit-CScore] : 0 [[id number]] : 0 [With Family] : lives with family [Employed] : employed [College] : College [] :  [# Of Children ___] : has [unfilled] children [High Risk Behavior] : no high risk behavior [Feels Safe at Home] : Feels safe at home [Fully functional (bathing, dressing, toileting, transferring, walking, feeding)] : Fully functional (bathing, dressing, toileting, transferring, walking, feeding) [Fully functional (using the telephone, shopping, preparing meals, housekeeping, doing laundry, using] : Fully functional and needs no help or supervision to perform IADLs (using the telephone, shopping, preparing meals, housekeeping, doing laundry, using transportation, managing medications and managing finances) [Reports changes in hearing] : Reports no changes in hearing [Reports changes in vision] : Reports no changes in vision [Reports normal functional visual acuity (ie: able to read med bottle)] : Reports normal functional visual acuity [Reports changes in dental health] : Reports no changes in dental health [Smoke Detector] : smoke detector [Carbon Monoxide Detector] : carbon monoxide detector [Safety elements used in home] : safety elements used in home [Seat Belt] :  uses seat belt [Sunscreen] : uses sunscreen [Travel to Developing Areas] : does not  travel to developing areas [TB Exposure] : is not being exposed to tuberculosis [Caregiver Concerns] : does not have caregiver concerns [ColonoscopyDate] : 07/16 [ColonoscopyComments] : recalls one polyp (Dr. GLADYS Fonseca) [HepatitisCDate] : 12/18

## 2024-09-20 NOTE — HISTORY OF PRESENT ILLNESS
[FreeTextEntry1] : CPE [de-identified] : Here for annual and to f/u HTN, DM, lung nodule s/p RVATS and RLL wedge resection and found to have cryptococcus s/p rx, adrenal nodule w negative work up for hormonal activty, intracranial hemangioma, asthma, hypertriglyceridemia .  Lung surgery was in Aug 2020. Seeing pulm, also sees endo for his DM, although not in some time - told him if he's been busy for those visits, we can consolidate asnd run here.  Deals w psoriasis: on/off in hands, elbows, ankles, knees and applying regular moisturizer. -Exercise: mirror exercise and goes to gym regularly -Diet: regular diet. snacking with fruit, cookies, chips. 2-3 diet.  -Smoking: marijuana occasionally, smoked cigarette at age 20 to 30s. He stopped on wedding day at age 28. Restarted and completely stopped 15 years go. -Recreational drug: no. marijuana occasionally drinking: once a month, 1-2 drinks.

## 2024-09-20 NOTE — HISTORY OF PRESENT ILLNESS
[FreeTextEntry1] : CPE [de-identified] : Here for annual and to f/u HTN, DM, lung nodule s/p RVATS and RLL wedge resection and found to have cryptococcus s/p rx, adrenal nodule w negative work up for hormonal activty, intracranial hemangioma, asthma, hypertriglyceridemia .  Lung surgery was in Aug 2020. Seeing pulm, also sees endo for his DM, although not in some time - told him if he's been busy for those visits, we can consolidate asnd run here.  Deals w psoriasis: on/off in hands, elbows, ankles, knees and applying regular moisturizer. -Exercise: mirror exercise and goes to gym regularly -Diet: regular diet. snacking with fruit, cookies, chips. 2-3 diet.  -Smoking: marijuana occasionally, smoked cigarette at age 20 to 30s. He stopped on wedding day at age 28. Restarted and completely stopped 15 years go. -Recreational drug: no. marijuana occasionally drinking: once a month, 1-2 drinks.

## 2024-09-20 NOTE — PHYSICAL EXAM
[No Acute Distress] : no acute distress [Well Nourished] : well nourished [Well Developed] : well developed [Well-Appearing] : well-appearing [Normal Sclera/Conjunctiva] : normal sclera/conjunctiva [PERRL] : pupils equal round and reactive to light [EOMI] : extraocular movements intact [Normal Outer Ear/Nose] : the outer ears and nose were normal in appearance [Normal Oropharynx] : the oropharynx was normal [Normal TMs] : both tympanic membranes were normal [No JVD] : no jugular venous distention [No Lymphadenopathy] : no lymphadenopathy [Supple] : supple [Thyroid Normal, No Nodules] : the thyroid was normal and there were no nodules present [No Respiratory Distress] : no respiratory distress  [No Accessory Muscle Use] : no accessory muscle use [Clear to Auscultation] : lungs were clear to auscultation bilaterally [Normal Rate] : normal rate  [Regular Rhythm] : with a regular rhythm [Normal S1, S2] : normal S1 and S2 [No Murmur] : no murmur heard [No Carotid Bruits] : no carotid bruits [No Abdominal Bruit] : a ~M bruit was not heard ~T in the abdomen [Pedal Pulses Present] : the pedal pulses are present [No Edema] : there was no peripheral edema [Soft] : abdomen soft [Non Tender] : non-tender [Non-distended] : non-distended [No Masses] : no abdominal mass palpated [No HSM] : no HSM [Normal Bowel Sounds] : normal bowel sounds [Normal Supraclavicular Nodes] : no supraclavicular lymphadenopathy [Normal Posterior Cervical Nodes] : no posterior cervical lymphadenopathy [Normal Anterior Cervical Nodes] : no anterior cervical lymphadenopathy [No CVA Tenderness] : no CVA  tenderness [No Spinal Tenderness] : no spinal tenderness [No Joint Swelling] : no joint swelling [Grossly Normal Strength/Tone] : grossly normal strength/tone [No Rash] : no rash [No Skin Lesions] : no skin lesions [Coordination Grossly Intact] : coordination grossly intact [No Focal Deficits] : no focal deficits [Normal Gait] : normal gait [Deep Tendon Reflexes (DTR)] : deep tendon reflexes were 2+ and symmetric [Speech Grossly Normal] : speech grossly normal [Memory Grossly Normal] : memory grossly normal [Normal Affect] : the affect was normal [Alert and Oriented x3] : oriented to person, place, and time [Normal Mood] : the mood was normal [Normal Insight/Judgement] : insight and judgment were intact [] : both feet [de-identified] : b/l hands joint numbness ad burning sense. No weakness. normal ROM [de-identified] : no suspicious nevi [TWNoteComboBox3] : +2 [TWNoteComboBox4] : +2

## 2024-09-27 ENCOUNTER — APPOINTMENT (OUTPATIENT)
Dept: INTERNAL MEDICINE | Facility: CLINIC | Age: 59
End: 2024-09-27
Payer: COMMERCIAL

## 2024-09-27 ENCOUNTER — OUTPATIENT (OUTPATIENT)
Dept: OUTPATIENT SERVICES | Facility: HOSPITAL | Age: 59
LOS: 1 days | End: 2024-09-27
Payer: COMMERCIAL

## 2024-09-27 VITALS
WEIGHT: 235 LBS | DIASTOLIC BLOOD PRESSURE: 76 MMHG | OXYGEN SATURATION: 97 % | HEIGHT: 69 IN | HEART RATE: 81 BPM | BODY MASS INDEX: 34.8 KG/M2 | SYSTOLIC BLOOD PRESSURE: 132 MMHG

## 2024-09-27 DIAGNOSIS — J45.909 UNSPECIFIED ASTHMA, UNCOMPLICATED: ICD-10-CM

## 2024-09-27 DIAGNOSIS — Z98.890 OTHER SPECIFIED POSTPROCEDURAL STATES: Chronic | ICD-10-CM

## 2024-09-27 DIAGNOSIS — Z87.81 PERSONAL HISTORY OF (HEALED) TRAUMATIC FRACTURE: Chronic | ICD-10-CM

## 2024-09-27 DIAGNOSIS — E78.1 PURE HYPERGLYCERIDEMIA: ICD-10-CM

## 2024-09-27 DIAGNOSIS — I10 ESSENTIAL (PRIMARY) HYPERTENSION: ICD-10-CM

## 2024-09-27 DIAGNOSIS — B45.9 CRYPTOCOCCOSIS, UNSPECIFIED: ICD-10-CM

## 2024-09-27 DIAGNOSIS — Z00.00 ENCOUNTER FOR GENERAL ADULT MEDICAL EXAMINATION W/OUT ABNORMAL FINDINGS: ICD-10-CM

## 2024-09-27 DIAGNOSIS — L85.9 EPIDERMAL THICKENING, UNSPECIFIED: ICD-10-CM

## 2024-09-27 DIAGNOSIS — Q28.3 OTHER MALFORMATIONS OF CEREBRAL VESSELS: ICD-10-CM

## 2024-09-27 PROCEDURE — 99396 PREV VISIT EST AGE 40-64: CPT | Mod: 25

## 2024-09-27 PROCEDURE — G0463: CPT | Mod: 25

## 2024-09-27 PROCEDURE — 90471 IMMUNIZATION ADMIN: CPT

## 2024-09-27 PROCEDURE — 90715 TDAP VACCINE 7 YRS/> IM: CPT

## 2024-09-27 PROCEDURE — 90656 IIV3 VACC NO PRSV 0.5 ML IM: CPT

## 2024-09-27 PROCEDURE — G0008: CPT

## 2024-09-30 ENCOUNTER — TRANSCRIPTION ENCOUNTER (OUTPATIENT)
Age: 59
End: 2024-09-30

## 2024-09-30 LAB
ALBUMIN SERPL ELPH-MCNC: 4.6 G/DL
ALP BLD-CCNC: 97 U/L
ALT SERPL-CCNC: 69 U/L
ANION GAP SERPL CALC-SCNC: 16 MMOL/L
AST SERPL-CCNC: 31 U/L
BASOPHILS # BLD AUTO: 0.06 K/UL
BASOPHILS NFR BLD AUTO: 0.8 %
BILIRUB SERPL-MCNC: 0.5 MG/DL
BUN SERPL-MCNC: 11 MG/DL
CALCIUM SERPL-MCNC: 9.2 MG/DL
CHLORIDE SERPL-SCNC: 103 MMOL/L
CHOLEST SERPL-MCNC: 98 MG/DL
CO2 SERPL-SCNC: 23 MMOL/L
CREAT SERPL-MCNC: 0.87 MG/DL
CREAT SPEC-SCNC: 177 MG/DL
EGFR: 100 ML/MIN/1.73M2
EOSINOPHIL # BLD AUTO: 0.19 K/UL
EOSINOPHIL NFR BLD AUTO: 2.4 %
ESTIMATED AVERAGE GLUCOSE: 163 MG/DL
GLUCOSE SERPL-MCNC: 122 MG/DL
HBA1C MFR BLD HPLC: 7.3 %
HCT VFR BLD CALC: 44.3 %
HDLC SERPL-MCNC: 46 MG/DL
HGB BLD-MCNC: 15 G/DL
IMM GRANULOCYTES NFR BLD AUTO: 0.3 %
LDLC SERPL CALC-MCNC: 29 MG/DL
LYMPHOCYTES # BLD AUTO: 2.33 K/UL
LYMPHOCYTES NFR BLD AUTO: 29.4 %
MAN DIFF?: NORMAL
MCHC RBC-ENTMCNC: 29.5 PG
MCHC RBC-ENTMCNC: 33.9 GM/DL
MCV RBC AUTO: 87 FL
MICROALBUMIN 24H UR DL<=1MG/L-MCNC: 4.2 MG/DL
MICROALBUMIN/CREAT 24H UR-RTO: 24 MG/G
MONOCYTES # BLD AUTO: 0.62 K/UL
MONOCYTES NFR BLD AUTO: 7.8 %
NEUTROPHILS # BLD AUTO: 4.71 K/UL
NEUTROPHILS NFR BLD AUTO: 59.3 %
NONHDLC SERPL-MCNC: 51 MG/DL
PLATELET # BLD AUTO: 244 K/UL
POTASSIUM SERPL-SCNC: 4.3 MMOL/L
PROT SERPL-MCNC: 7.1 G/DL
PSA SERPL-MCNC: 0.44 NG/ML
RBC # BLD: 5.09 M/UL
RBC # FLD: 13.7 %
SODIUM SERPL-SCNC: 142 MMOL/L
T4 FREE SERPL-MCNC: 0.8 NG/DL
TRIGL SERPL-MCNC: 126 MG/DL
TSH SERPL-ACNC: 1.22 UIU/ML
WBC # FLD AUTO: 7.93 K/UL

## 2024-10-08 DIAGNOSIS — Z00.00 ENCOUNTER FOR GENERAL ADULT MEDICAL EXAMINATION WITHOUT ABNORMAL FINDINGS: ICD-10-CM

## 2024-10-08 DIAGNOSIS — L85.9 EPIDERMAL THICKENING, UNSPECIFIED: ICD-10-CM

## 2024-10-08 DIAGNOSIS — Q28.3 OTHER MALFORMATIONS OF CEREBRAL VESSELS: ICD-10-CM

## 2024-10-08 DIAGNOSIS — J45.909 UNSPECIFIED ASTHMA, UNCOMPLICATED: ICD-10-CM

## 2024-10-08 DIAGNOSIS — Z23 ENCOUNTER FOR IMMUNIZATION: ICD-10-CM

## 2024-10-08 DIAGNOSIS — E78.1 PURE HYPERGLYCERIDEMIA: ICD-10-CM

## 2024-10-08 DIAGNOSIS — B45.9 CRYPTOCOCCOSIS, UNSPECIFIED: ICD-10-CM

## 2024-11-04 NOTE — ED ADULT TRIAGE NOTE - DOMESTIC TRAVEL HIGH RISK QUESTION
Documented history of hypertension but not on any antihypertensives at home.  Blood pressure is acceptable this admission  Patient and chart review, patient usually taking multipin 5 mg and losartan 50 mg daily before being discontinued in June 2024    BP overnight 100-130s/70-80s    Plan:  - Continue amlodipine 5 mg     No

## 2024-11-18 ENCOUNTER — RX RENEWAL (OUTPATIENT)
Age: 59
End: 2024-11-18

## 2024-11-20 ENCOUNTER — RX RENEWAL (OUTPATIENT)
Age: 59
End: 2024-11-20

## 2025-01-08 ENCOUNTER — APPOINTMENT (OUTPATIENT)
Dept: GASTROENTEROLOGY | Facility: CLINIC | Age: 60
End: 2025-01-08
Payer: COMMERCIAL

## 2025-01-08 ENCOUNTER — RX RENEWAL (OUTPATIENT)
Age: 60
End: 2025-01-08

## 2025-01-08 VITALS
OXYGEN SATURATION: 98 % | HEIGHT: 69 IN | WEIGHT: 240 LBS | HEART RATE: 81 BPM | DIASTOLIC BLOOD PRESSURE: 82 MMHG | SYSTOLIC BLOOD PRESSURE: 141 MMHG | BODY MASS INDEX: 35.55 KG/M2

## 2025-01-08 DIAGNOSIS — R13.10 DYSPHAGIA, UNSPECIFIED: ICD-10-CM

## 2025-01-08 DIAGNOSIS — Z12.11 ENCOUNTER FOR SCREENING FOR MALIGNANT NEOPLASM OF COLON: ICD-10-CM

## 2025-01-08 DIAGNOSIS — R19.5 OTHER FECAL ABNORMALITIES: ICD-10-CM

## 2025-01-08 DIAGNOSIS — K21.9 GASTRO-ESOPHAGEAL REFLUX DISEASE W/OUT ESOPHAGITIS: ICD-10-CM

## 2025-01-08 DIAGNOSIS — R14.0 ABDOMINAL DISTENSION (GASEOUS): ICD-10-CM

## 2025-01-08 PROCEDURE — 99214 OFFICE O/P EST MOD 30 MIN: CPT

## 2025-01-08 RX ORDER — OMEPRAZOLE 20 MG/1
20 CAPSULE, DELAYED RELEASE ORAL DAILY
Qty: 90 | Refills: 0 | Status: ACTIVE | COMMUNITY
Start: 2025-01-08 | End: 1900-01-01

## 2025-01-08 RX ORDER — SODIUM SULFATE, POTASSIUM SULFATE AND MAGNESIUM SULFATE 1.6; 3.13; 17.5 G/177ML; G/177ML; G/177ML
17.5-3.13-1.6 SOLUTION ORAL
Qty: 1 | Refills: 0 | Status: ACTIVE | COMMUNITY
Start: 2025-01-08 | End: 1900-01-01

## 2025-01-24 ENCOUNTER — NON-APPOINTMENT (OUTPATIENT)
Age: 60
End: 2025-01-24

## 2025-04-10 ENCOUNTER — OUTPATIENT (OUTPATIENT)
Dept: OUTPATIENT SERVICES | Facility: HOSPITAL | Age: 60
LOS: 1 days | End: 2025-04-10
Payer: COMMERCIAL

## 2025-04-10 ENCOUNTER — APPOINTMENT (OUTPATIENT)
Dept: GASTROENTEROLOGY | Facility: HOSPITAL | Age: 60
End: 2025-04-10

## 2025-04-10 ENCOUNTER — RESULT REVIEW (OUTPATIENT)
Age: 60
End: 2025-04-10

## 2025-04-10 ENCOUNTER — TRANSCRIPTION ENCOUNTER (OUTPATIENT)
Age: 60
End: 2025-04-10

## 2025-04-10 VITALS
DIASTOLIC BLOOD PRESSURE: 65 MMHG | OXYGEN SATURATION: 95 % | HEIGHT: 70 IN | WEIGHT: 229.94 LBS | SYSTOLIC BLOOD PRESSURE: 134 MMHG | TEMPERATURE: 97 F | HEART RATE: 72 BPM | RESPIRATION RATE: 20 BRPM

## 2025-04-10 VITALS
DIASTOLIC BLOOD PRESSURE: 63 MMHG | RESPIRATION RATE: 20 BRPM | SYSTOLIC BLOOD PRESSURE: 122 MMHG | HEART RATE: 71 BPM | OXYGEN SATURATION: 95 %

## 2025-04-10 DIAGNOSIS — Z98.890 OTHER SPECIFIED POSTPROCEDURAL STATES: Chronic | ICD-10-CM

## 2025-04-10 DIAGNOSIS — Z87.81 PERSONAL HISTORY OF (HEALED) TRAUMATIC FRACTURE: Chronic | ICD-10-CM

## 2025-04-10 DIAGNOSIS — K21.9 GASTRO-ESOPHAGEAL REFLUX DISEASE WITHOUT ESOPHAGITIS: ICD-10-CM

## 2025-04-10 LAB — GLUCOSE BLDC GLUCOMTR-MCNC: 126 MG/DL — HIGH (ref 70–99)

## 2025-04-10 PROCEDURE — 43239 EGD BIOPSY SINGLE/MULTIPLE: CPT

## 2025-04-10 PROCEDURE — 82962 GLUCOSE BLOOD TEST: CPT

## 2025-04-10 PROCEDURE — 45385 COLONOSCOPY W/LESION REMOVAL: CPT | Mod: GC

## 2025-04-10 PROCEDURE — 43239 EGD BIOPSY SINGLE/MULTIPLE: CPT | Mod: GC

## 2025-04-10 PROCEDURE — 45385 COLONOSCOPY W/LESION REMOVAL: CPT | Mod: PT

## 2025-04-10 PROCEDURE — 88305 TISSUE EXAM BY PATHOLOGIST: CPT | Mod: 26

## 2025-04-10 PROCEDURE — 88305 TISSUE EXAM BY PATHOLOGIST: CPT

## 2025-04-10 PROCEDURE — C1889: CPT

## 2025-04-10 DEVICE — CLIP RESOLUTION 360 235CM: Type: IMPLANTABLE DEVICE | Status: FUNCTIONAL

## 2025-04-10 DEVICE — NET RETRV ROT ROTH 2.5MMX230CM: Type: IMPLANTABLE DEVICE | Status: FUNCTIONAL

## 2025-04-10 NOTE — ASU PATIENT PROFILE, ADULT - FALL HARM RISK - HARM RISK INTERVENTIONS

## 2025-04-10 NOTE — PRE PROCEDURE NOTE - PRE PROCEDURE EVALUATION
Attending Physician:             Alicia Michel               Procedure: upper endoscopy, colonoscopy    Indication for Procedure: GERD, dysphagia, colon cancer screening  ________________________________________________________  PAST MEDICAL & SURGICAL HISTORY:  fracture Right tibia  s/p surgery      Allergic Asthma  induced by Cat dandur      Psoriasis      Cellulitis      Esophageal reflux      Labyrinthitis      Sinusitis  s/p      Arthritis      HTN (hypertension)      Lung nodule  rll, had cryptococcus infection, treated, s/p surgery      Nephrolithiasis  left, monitored      Adrenal nodule  found incidentally, monitored      Fatty liver      Diabetes mellitus      COVID-19 virus infection  positive on 3/22/22      Cervical spondylosis      Lumbar radiculopathy      Orbital fracture      HLD (hyperlipidemia)      H/O carpal tunnel syndrome      History of lung surgery  Right VATS RLL wedge resection 2019      S/P craniofacial reconstruction      S/p tibial fracture      H/O carpal tunnel repair      H/O foot surgery        ALLERGIES:  penicillin (Anaphylaxis; Angioedema; Pruritus)    HOME MEDICATIONS:  albuterol 90 mcg/inh inhalation aerosol: 2 puff(s) inhaled every 6 hours, As Needed  amLODIPine: 7.5 milligram(s) orally once a day AM  metFORMIN 500 mg oral tablet: 1 tab(s) orally once a day AM  rosuvastatin 10 mg oral tablet: 1 tab(s) orally once a day AM    AICD/PPM: [ ] yes   [ ] no    PERTINENT LAB DATA:                      PHYSICAL EXAMINATION:    T(C): --  HR: --  BP: --  RR: --  SpO2: --    Constitutional: NAD  HEENT: PERRLA, EOMI,    Neck:  No JVD  Respiratory: CTAB/L  Cardiovascular: S1 and S2  Gastrointestinal: BS+, soft, NT/ND  Extremities: No peripheral edema  Neurological: A/O x 3, no focal deficits  Psychiatric: Normal mood, normal affect  Skin: No rashes    ASA Class: I [ ]  II [ ]  III [ ]  IV [ ]    COMMENTS:    The patient is a suitable candidate for the planned procedure unless box checked [ ]  No, explain:

## 2025-04-10 NOTE — ASU PREOP CHECKLIST - IV STARTED
Aaron yes

## 2025-04-10 NOTE — ASU DISCHARGE PLAN (ADULT/PEDIATRIC) - FINANCIAL ASSISTANCE
[facility] provides services at a reduced cost to those who are determined to be eligible through [facility]’s financial assistance program. Information regarding [facility]’s financial assistance program can be found by going to [email or web address] or by calling [phone].

## 2025-04-15 LAB — SURGICAL PATHOLOGY STUDY: SIGNIFICANT CHANGE UP

## 2025-04-21 ENCOUNTER — NON-APPOINTMENT (OUTPATIENT)
Age: 60
End: 2025-04-21

## 2025-04-21 RX ORDER — PANTOPRAZOLE 20 MG/1
20 TABLET, DELAYED RELEASE ORAL DAILY
Qty: 90 | Refills: 3 | Status: ACTIVE | COMMUNITY
Start: 2025-04-21 | End: 1900-01-01

## (undated) DEVICE — POLY TRAP ETRAP

## (undated) DEVICE — CANISTER DISPOSABLE THIN WALL 3000CC

## (undated) DEVICE — DRAPE TOWEL BLUE 17" X 24"

## (undated) DEVICE — IRRIGATOR BIO SHIELD

## (undated) DEVICE — Device

## (undated) DEVICE — SUT VICRYL 4-0 18" RB-1 UNDYED (POP-OFF)

## (undated) DEVICE — SYR ALLIANCE II INFLATION 60ML

## (undated) DEVICE — DRAPE HAND 77" X 146"

## (undated) DEVICE — BIPOLAR FORCEP KIRWAN JEWELERS STR 4" X 0.4MM W 12FT CORD (GREEN)

## (undated) DEVICE — GLV 7.5 PROTEXIS (CREAM) MICRO

## (undated) DEVICE — SUT VICRYL 3-0 18" SH UNDYED (POP-OFF)

## (undated) DEVICE — SOL INJ NS 0.9% 500ML 2 PORT

## (undated) DEVICE — POSITIONER STRAP ARMBOARD VELCRO TS-30

## (undated) DEVICE — DRSG STOCKINETTE UNDERCAST 6"

## (undated) DEVICE — DRAPE MINOR PROCEDURE

## (undated) DEVICE — LABELS BLANK W PEN

## (undated) DEVICE — SNARE LESIONHUNTER ROTAT NITNL COLD 15MM

## (undated) DEVICE — TUBING CAP SET ENDO 24HR USE GI

## (undated) DEVICE — MARKING PEN W RULER

## (undated) DEVICE — SOL IRR POUR H2O 500ML

## (undated) DEVICE — TUBING SUCTION CONN 6FT STERILE

## (undated) DEVICE — PACK HAND TRAY

## (undated) DEVICE — BRUSH COLONOSCOPY CYTOLOGY

## (undated) DEVICE — PACK IV START WITH CHG

## (undated) DEVICE — SUCTION YANKAUER NO CONTROL VENT

## (undated) DEVICE — SUT MONOCRYL 4-0 27" PS-2 UNDYED

## (undated) DEVICE — BALLOON US ENDO

## (undated) DEVICE — BIOPSY FORCEP RADIAL JAW 4 STANDARD WITH NEEDLE

## (undated) DEVICE — TUBING IV SET GRAVITY 3Y 100" MACRO

## (undated) DEVICE — VESSEL LOOP MINI-BLUE 0.075" X 16"

## (undated) DEVICE — TUBING SUCTION 20FT

## (undated) DEVICE — FORCEP RADIAL JAW 4 JUMBO 2.8MM 3.2MM 240CM ORANGE DISP

## (undated) DEVICE — MEDTRONIC RADIALUX LIGHTED RETRACTOR DISP

## (undated) DEVICE — ONETRAC LIGHTED RETRACTOR 90 X 22MM DISP

## (undated) DEVICE — DRAPE SPLIT SHEET 77" X 108"

## (undated) DEVICE — CLAMP BX HOT RAD JAW 3

## (undated) DEVICE — ELCTR GROUNDING PAD ADULT COVIDIEN

## (undated) DEVICE — STAPLER SKIN VISI-STAT 35 WIDE

## (undated) DEVICE — CATH IV SAFE BC 20G X 1.16" (PINK)

## (undated) DEVICE — CATH IV SAFE BC 22G X 1" (BLUE)

## (undated) DEVICE — FOLEY HOLDER STATLOCK 2 WAY ADULT

## (undated) DEVICE — SENSOR O2 FINGER ADULT

## (undated) DEVICE — ELCTR BOVIE TIP NEEDLE INSULATED 2.8" EDGE

## (undated) DEVICE — BITE BLOCK ADULT 20 X 27MM (GREEN)

## (undated) DEVICE — SYR LUER LOK 50CC